# Patient Record
Sex: MALE | Race: WHITE | NOT HISPANIC OR LATINO | Employment: UNEMPLOYED | URBAN - METROPOLITAN AREA
[De-identification: names, ages, dates, MRNs, and addresses within clinical notes are randomized per-mention and may not be internally consistent; named-entity substitution may affect disease eponyms.]

---

## 2018-05-03 ENCOUNTER — HOSPITAL ENCOUNTER (EMERGENCY)
Facility: HOSPITAL | Age: 58
Discharge: HOME | End: 2018-05-03
Attending: FAMILY MEDICINE

## 2018-05-03 VITALS
DIASTOLIC BLOOD PRESSURE: 83 MMHG | BODY MASS INDEX: 43.95 KG/M2 | RESPIRATION RATE: 20 BRPM | SYSTOLIC BLOOD PRESSURE: 169 MMHG | HEIGHT: 67 IN | OXYGEN SATURATION: 98 % | WEIGHT: 280 LBS | TEMPERATURE: 98 F | HEART RATE: 82 BPM

## 2018-05-03 DIAGNOSIS — M79.673 FOOT PAIN: Primary | ICD-10-CM

## 2018-05-03 PROCEDURE — 25000003 PHARM REV CODE 250: Performed by: FAMILY MEDICINE

## 2018-05-03 PROCEDURE — 99283 EMERGENCY DEPT VISIT LOW MDM: CPT

## 2018-05-03 RX ORDER — LISINOPRIL 20 MG/1
20 TABLET ORAL 2 TIMES DAILY
COMMUNITY

## 2018-05-03 RX ORDER — DICLOFENAC SODIUM 50 MG/1
50 TABLET, DELAYED RELEASE ORAL 2 TIMES DAILY
Qty: 21 TABLET | Refills: 0 | Status: SHIPPED | OUTPATIENT
Start: 2018-05-03 | End: 2023-07-18 | Stop reason: CLARIF

## 2018-05-03 RX ORDER — IBUPROFEN 600 MG/1
600 TABLET ORAL
Status: COMPLETED | OUTPATIENT
Start: 2018-05-03 | End: 2018-05-03

## 2018-05-03 RX ADMIN — IBUPROFEN 600 MG: 600 TABLET, FILM COATED ORAL at 05:05

## 2018-05-03 NOTE — ED PROVIDER NOTES
Encounter Date: 5/3/2018       History     Chief Complaint   Patient presents with    Foot Pain     L foot pain worsening x 3 days; hx of chronic foot pain x 2 years s/p foot surgery; denies reinjury     57-year-old male patient with left sided foot pain patient has history of chronic foot pain with this patient has a walking Tamil boot on otherwise patient states it's more a matter of working with his Work[employer]'s [employer]. for surgery that he needs on his foot otherwise patient continues to have discomfort of the left foot he does have a history of surgery there before with screws in place.          Review of patient's allergies indicates:  No Known Allergies  Past Medical History:   Diagnosis Date    Hypertension      Past Surgical History:   Procedure Laterality Date    FOOT SURGERY Left      History reviewed. No pertinent family history.  Social History   Substance Use Topics    Smoking status: Current Every Day Smoker     Packs/day: 1.00     Types: Cigarettes    Smokeless tobacco: Never Used    Alcohol use Yes      Comment: twice a week     Review of Systems   Constitutional: Negative for fever.   HENT: Negative for sore throat.    Respiratory: Negative for shortness of breath.    Cardiovascular: Negative for chest pain.   Gastrointestinal: Negative for nausea.   Genitourinary: Negative for dysuria.   Musculoskeletal: Positive for arthralgias and myalgias. Negative for back pain.   Skin: Negative for rash.   Neurological: Negative for weakness.   Hematological: Does not bruise/bleed easily.   All other systems reviewed and are negative.      Physical Exam     Initial Vitals [05/03/18 0432]   BP Pulse Resp Temp SpO2   (!) 194/103 93 18 97.7 °F (36.5 °C) 100 %      MAP       133.33         Physical Exam    Nursing note and vitals reviewed.  Constitutional: He appears well-developed and well-nourished.   HENT:   Head: Normocephalic and atraumatic.   Eyes: Conjunctivae and EOM are normal. Pupils are equal, round,  and reactive to light.   Neck: Normal range of motion. Neck supple.   Cardiovascular: Normal rate, regular rhythm and normal heart sounds.   Pulmonary/Chest: Breath sounds normal.   Abdominal: Soft. Bowel sounds are normal.   Musculoskeletal: Normal range of motion. He exhibits tenderness.   Neurological: He is alert. He has normal reflexes.         ED Course   Procedures  Labs Reviewed - No data to display       X-Rays:   Independently Interpreted Readings:   Other Readings:  Please see radiology report    Medical Decision Making:   Initial Assessment:   Patient sitting in no distress and pleasant. Patient has no other complaints other than documented.     Differential Diagnosis:   Fracture  Edema  Sprain   strain                        Clinical Impression:   The encounter diagnosis was Foot pain.                           Avni Dela Cruz MD  05/03/18 8769

## 2022-05-13 ENCOUNTER — HOSPITAL ENCOUNTER (EMERGENCY)
Facility: HOSPITAL | Age: 62
Discharge: STILL ADMITTED OR ADMINISTRATIVE | End: 2022-05-13
Attending: EMERGENCY MEDICINE
Payer: MEDICAID

## 2022-05-13 ENCOUNTER — HOSPITAL ENCOUNTER (OUTPATIENT)
Facility: HOSPITAL | Age: 62
Discharge: HOME | End: 2022-05-14
Attending: HOSPITALIST | Admitting: HOSPITALIST
Payer: MEDICAID

## 2022-05-13 VITALS
TEMPERATURE: 99 F | RESPIRATION RATE: 20 BRPM | DIASTOLIC BLOOD PRESSURE: 85 MMHG | HEART RATE: 84 BPM | WEIGHT: 217 LBS | SYSTOLIC BLOOD PRESSURE: 141 MMHG | BODY MASS INDEX: 33.99 KG/M2 | OXYGEN SATURATION: 95 %

## 2022-05-13 DIAGNOSIS — I26.99 BILATERAL PULMONARY EMBOLISM: Primary | ICD-10-CM

## 2022-05-13 DIAGNOSIS — R07.9 CHEST PAIN: ICD-10-CM

## 2022-05-13 DIAGNOSIS — J15.9 BACTERIAL PNEUMONIA: ICD-10-CM

## 2022-05-13 DIAGNOSIS — I26.99 PULMONARY EMBOLISM: ICD-10-CM

## 2022-05-13 DIAGNOSIS — I26.99 OTHER PULMONARY EMBOLISM WITHOUT ACUTE COR PULMONALE, UNSPECIFIED CHRONICITY: Primary | ICD-10-CM

## 2022-05-13 PROBLEM — I10 ESSENTIAL HYPERTENSION: Status: ACTIVE | Noted: 2022-05-13

## 2022-05-13 PROBLEM — E66.811 CLASS 1 OBESITY IN ADULT: Status: ACTIVE | Noted: 2022-05-13

## 2022-05-13 PROBLEM — Z72.0 TOBACCO ABUSE: Status: ACTIVE | Noted: 2022-05-13

## 2022-05-13 PROBLEM — R14.0 ABDOMINAL BLOATING: Status: ACTIVE | Noted: 2022-05-13

## 2022-05-13 PROBLEM — E66.9 CLASS 1 OBESITY IN ADULT: Status: ACTIVE | Noted: 2022-05-13

## 2022-05-13 LAB
ALBUMIN SERPL BCP-MCNC: 3.8 G/DL (ref 3.5–5.2)
ALP SERPL-CCNC: 91 U/L (ref 38–126)
ALT SERPL W/O P-5'-P-CCNC: 14 U/L (ref 10–44)
AMPHET+METHAMPHET UR QL: NEGATIVE
ANION GAP SERPL CALC-SCNC: 8 MMOL/L (ref 8–16)
APTT BLDCRRT: 28.6 SEC (ref 21–32)
AST SERPL-CCNC: 20 U/L (ref 15–46)
AV INDEX (PROSTH): 0.44
AV MEAN GRADIENT: 20 MMHG
AV PEAK GRADIENT: 36 MMHG
AV VALVE AREA: 1.45 CM2
AV VELOCITY RATIO: 0.39
BARBITURATES UR QL SCN>200 NG/ML: NEGATIVE
BASOPHILS # BLD AUTO: 0.08 K/UL (ref 0–0.2)
BASOPHILS NFR BLD: 0.7 % (ref 0–1.9)
BENZODIAZ UR QL SCN>200 NG/ML: NEGATIVE
BILIRUB SERPL-MCNC: 1 MG/DL (ref 0.1–1)
BSA FOR ECHO PROCEDURE: 2.18 M2
BZE UR QL SCN: NEGATIVE
CALCIUM SERPL-MCNC: 8.8 MG/DL (ref 8.7–10.5)
CANNABINOIDS UR QL SCN: NEGATIVE
CHLORIDE SERPL-SCNC: 101 MMOL/L (ref 95–110)
CO2 SERPL-SCNC: 29 MMOL/L (ref 23–29)
CREAT SERPL-MCNC: 0.58 MG/DL (ref 0.5–1.4)
CREAT UR-MCNC: 74.3 MG/DL (ref 23–375)
CV ECHO LV RWT: 0.51 CM
D DIMER PPP IA.FEU-MCNC: 6.44 MG/L FEU
DIFFERENTIAL METHOD: ABNORMAL
DOP CALC AO PEAK VEL: 2.98 M/S
DOP CALC AO VTI: 56.41 CM
DOP CALC LVOT AREA: 3.3 CM2
DOP CALC LVOT DIAMETER: 2.04 CM
DOP CALC LVOT PEAK VEL: 1.15 M/S
DOP CALC LVOT STROKE VOLUME: 81.8 CM3
DOP CALC MV VTI: 38.15 CM
DOP CALCLVOT PEAK VEL VTI: 25.04 CM
E WAVE DECELERATION TIME: 153.92 MSEC
E/A RATIO: 0.94
E/E' RATIO: 21.29 M/S
ECHO LV POSTERIOR WALL: 0.97 CM (ref 0.6–1.1)
EJECTION FRACTION: 60 %
EOSINOPHIL # BLD AUTO: 0.2 K/UL (ref 0–0.5)
EOSINOPHIL NFR BLD: 1.4 % (ref 0–8)
ERYTHROCYTE [DISTWIDTH] IN BLOOD BY AUTOMATED COUNT: 13.4 % (ref 11.5–14.5)
EST. GFR  (AFRICAN AMERICAN): >60 ML/MIN/1.73 M^2
EST. GFR  (NON AFRICAN AMERICAN): >60 ML/MIN/1.73 M^2
FRACTIONAL SHORTENING: 29 % (ref 28–44)
GLUCOSE SERPL-MCNC: 102 MG/DL (ref 70–110)
HCT VFR BLD AUTO: 48.9 % (ref 40–54)
HGB BLD-MCNC: 17.3 G/DL (ref 14–18)
HIV1+2 IGG SERPL QL IA.RAPID: NORMAL
IMM GRANULOCYTES # BLD AUTO: 0.04 K/UL (ref 0–0.04)
IMM GRANULOCYTES NFR BLD AUTO: 0.4 % (ref 0–0.5)
INR PPP: 1 (ref 0.8–1.2)
INTERVENTRICULAR SEPTUM: 1.13 CM (ref 0.6–1.1)
LA MAJOR: 4.82 CM
LA MINOR: 5.74 CM
LA WIDTH: 4.15 CM
LEFT ATRIUM SIZE: 4.18 CM
LEFT ATRIUM VOLUME INDEX: 36.4 ML/M2
LEFT ATRIUM VOLUME: 77.26 CM3
LEFT INTERNAL DIMENSION IN SYSTOLE: 2.72 CM (ref 2.1–4)
LEFT VENTRICLE DIASTOLIC VOLUME INDEX: 29.64 ML/M2
LEFT VENTRICLE DIASTOLIC VOLUME: 62.84 ML
LEFT VENTRICLE MASS INDEX: 60 G/M2
LEFT VENTRICLE SYSTOLIC VOLUME INDEX: 13 ML/M2
LEFT VENTRICLE SYSTOLIC VOLUME: 27.47 ML
LEFT VENTRICULAR INTERNAL DIMENSION IN DIASTOLE: 3.82 CM (ref 3.5–6)
LEFT VENTRICULAR MASS: 126.84 G
LV LATERAL E/E' RATIO: 24.83 M/S
LV SEPTAL E/E' RATIO: 18.63 M/S
LYMPHOCYTES # BLD AUTO: 2.2 K/UL (ref 1–4.8)
LYMPHOCYTES NFR BLD: 19.4 % (ref 18–48)
MCH RBC QN AUTO: 37.1 PG (ref 27–31)
MCHC RBC AUTO-ENTMCNC: 35.4 G/DL (ref 32–36)
MCV RBC AUTO: 105 FL (ref 82–98)
METHADONE UR QL SCN>300 NG/ML: NEGATIVE
MONOCYTES # BLD AUTO: 1 K/UL (ref 0.3–1)
MONOCYTES NFR BLD: 8.8 % (ref 4–15)
MV MEAN GRADIENT: 1 MMHG
MV PEAK A VEL: 1.59 M/S
MV PEAK E VEL: 1.49 M/S
MV PEAK GRADIENT: 14 MMHG
MV STENOSIS PRESSURE HALF TIME: 44.64 MS
MV VALVE AREA BY CONTINUITY EQUATION: 2.14 CM2
MV VALVE AREA P 1/2 METHOD: 4.93 CM2
NEUTROPHILS # BLD AUTO: 7.7 K/UL (ref 1.8–7.7)
NEUTROPHILS NFR BLD: 69.3 % (ref 38–73)
NRBC BLD-RTO: 0 /100 WBC
OPIATES UR QL SCN: NEGATIVE
PCP UR QL SCN>25 NG/ML: NEGATIVE
PISA TR MAX VEL: 2.22 M/S
PLATELET # BLD AUTO: 177 K/UL (ref 150–450)
PMV BLD AUTO: 10.5 FL (ref 9.2–12.9)
POTASSIUM SERPL-SCNC: 4.2 MMOL/L (ref 3.5–5.1)
PROCALCITONIN SERPL IA-MCNC: 0.03 NG/ML
PROT SERPL-MCNC: 7.4 G/DL (ref 6–8.4)
PROTHROMBIN TIME: 10.9 SEC (ref 9–12.5)
RA PRESSURE: 3 MMHG
RBC # BLD AUTO: 4.66 M/UL (ref 4.6–6.2)
SARS-COV-2 RDRP RESP QL NAA+PROBE: NEGATIVE
SODIUM SERPL-SCNC: 138 MMOL/L (ref 136–145)
TDI LATERAL: 0.06 M/S
TDI SEPTAL: 0.08 M/S
TDI: 0.07 M/S
TOXICOLOGY INFORMATION: NORMAL
TR MAX PG: 20 MMHG
TROPONIN I SERPL-MCNC: <0.012 NG/ML (ref 0.01–0.03)
TSH SERPL DL<=0.005 MIU/L-ACNC: 2.71 UIU/ML (ref 0.4–4)
TV REST PULMONARY ARTERY PRESSURE: 23 MMHG
UUN UR-MCNC: 7 MG/DL (ref 2–20)
WBC # BLD AUTO: 11.09 K/UL (ref 3.9–12.7)

## 2022-05-13 PROCEDURE — 87040 BLOOD CULTURE FOR BACTERIA: CPT | Mod: 59,ER | Performed by: EMERGENCY MEDICINE

## 2022-05-13 PROCEDURE — A9698 NON-RAD CONTRAST MATERIALNOC: HCPCS | Performed by: HOSPITALIST

## 2022-05-13 PROCEDURE — 93010 ELECTROCARDIOGRAM REPORT: CPT | Mod: ,,, | Performed by: INTERNAL MEDICINE

## 2022-05-13 PROCEDURE — 84153 ASSAY OF PSA TOTAL: CPT | Performed by: HOSPITALIST

## 2022-05-13 PROCEDURE — 86703 HIV-1/HIV-2 1 RESULT ANTBDY: CPT | Performed by: HOSPITALIST

## 2022-05-13 PROCEDURE — 25500020 PHARM REV CODE 255: Performed by: HOSPITALIST

## 2022-05-13 PROCEDURE — G0378 HOSPITAL OBSERVATION PER HR: HCPCS

## 2022-05-13 PROCEDURE — 85379 FIBRIN DEGRADATION QUANT: CPT | Mod: ER | Performed by: EMERGENCY MEDICINE

## 2022-05-13 PROCEDURE — 85610 PROTHROMBIN TIME: CPT | Mod: ER | Performed by: EMERGENCY MEDICINE

## 2022-05-13 PROCEDURE — 96372 THER/PROPH/DIAG INJ SC/IM: CPT | Mod: 59 | Performed by: HOSPITALIST

## 2022-05-13 PROCEDURE — 63600175 PHARM REV CODE 636 W HCPCS: Performed by: HOSPITALIST

## 2022-05-13 PROCEDURE — 80307 DRUG TEST PRSMV CHEM ANLYZR: CPT | Performed by: HOSPITALIST

## 2022-05-13 PROCEDURE — 99285 EMERGENCY DEPT VISIT HI MDM: CPT | Mod: 25,ER

## 2022-05-13 PROCEDURE — 96365 THER/PROPH/DIAG IV INF INIT: CPT | Mod: ER,59

## 2022-05-13 PROCEDURE — 36415 COLL VENOUS BLD VENIPUNCTURE: CPT | Performed by: HOSPITALIST

## 2022-05-13 PROCEDURE — G0379 DIRECT REFER HOSPITAL OBSERV: HCPCS

## 2022-05-13 PROCEDURE — U0002 COVID-19 LAB TEST NON-CDC: HCPCS | Mod: ER | Performed by: EMERGENCY MEDICINE

## 2022-05-13 PROCEDURE — S4991 NICOTINE PATCH NONLEGEND: HCPCS | Performed by: HOSPITALIST

## 2022-05-13 PROCEDURE — 25000003 PHARM REV CODE 250: Mod: ER | Performed by: EMERGENCY MEDICINE

## 2022-05-13 PROCEDURE — 84145 PROCALCITONIN (PCT): CPT | Performed by: HOSPITALIST

## 2022-05-13 PROCEDURE — 85025 COMPLETE CBC W/AUTO DIFF WBC: CPT | Mod: ER | Performed by: EMERGENCY MEDICINE

## 2022-05-13 PROCEDURE — 96372 THER/PROPH/DIAG INJ SC/IM: CPT | Performed by: EMERGENCY MEDICINE

## 2022-05-13 PROCEDURE — 63600175 PHARM REV CODE 636 W HCPCS: Mod: ER | Performed by: EMERGENCY MEDICINE

## 2022-05-13 PROCEDURE — 25500020 PHARM REV CODE 255: Mod: ER | Performed by: EMERGENCY MEDICINE

## 2022-05-13 PROCEDURE — 93010 EKG 12-LEAD: ICD-10-PCS | Mod: ,,, | Performed by: INTERNAL MEDICINE

## 2022-05-13 PROCEDURE — 25000003 PHARM REV CODE 250: Performed by: HOSPITALIST

## 2022-05-13 PROCEDURE — 96375 TX/PRO/DX INJ NEW DRUG ADDON: CPT | Mod: ER

## 2022-05-13 PROCEDURE — 85730 THROMBOPLASTIN TIME PARTIAL: CPT | Mod: ER | Performed by: EMERGENCY MEDICINE

## 2022-05-13 PROCEDURE — 93005 ELECTROCARDIOGRAM TRACING: CPT | Mod: ER

## 2022-05-13 PROCEDURE — 84443 ASSAY THYROID STIM HORMONE: CPT | Performed by: HOSPITALIST

## 2022-05-13 PROCEDURE — 80053 COMPREHEN METABOLIC PANEL: CPT | Mod: ER | Performed by: EMERGENCY MEDICINE

## 2022-05-13 PROCEDURE — 84484 ASSAY OF TROPONIN QUANT: CPT | Mod: ER | Performed by: EMERGENCY MEDICINE

## 2022-05-13 RX ORDER — IBUPROFEN 200 MG
24 TABLET ORAL
Status: DISCONTINUED | OUTPATIENT
Start: 2022-05-13 | End: 2022-05-14 | Stop reason: HOSPADM

## 2022-05-13 RX ORDER — LISINOPRIL 20 MG/1
20 TABLET ORAL
Status: COMPLETED | OUTPATIENT
Start: 2022-05-13 | End: 2022-05-13

## 2022-05-13 RX ORDER — SODIUM CHLORIDE 0.9 % (FLUSH) 0.9 %
10 SYRINGE (ML) INJECTION EVERY 12 HOURS PRN
Status: DISCONTINUED | OUTPATIENT
Start: 2022-05-13 | End: 2022-05-14 | Stop reason: HOSPADM

## 2022-05-13 RX ORDER — ONDANSETRON 2 MG/ML
4 INJECTION INTRAMUSCULAR; INTRAVENOUS EVERY 4 HOURS PRN
Status: DISCONTINUED | OUTPATIENT
Start: 2022-05-13 | End: 2022-05-14 | Stop reason: HOSPADM

## 2022-05-13 RX ORDER — ENOXAPARIN SODIUM 100 MG/ML
1 INJECTION SUBCUTANEOUS EVERY 12 HOURS
Status: DISCONTINUED | OUTPATIENT
Start: 2022-05-13 | End: 2022-05-14 | Stop reason: HOSPADM

## 2022-05-13 RX ORDER — ACETAMINOPHEN 325 MG/1
650 TABLET ORAL EVERY 6 HOURS PRN
Status: DISCONTINUED | OUTPATIENT
Start: 2022-05-13 | End: 2022-05-14 | Stop reason: HOSPADM

## 2022-05-13 RX ORDER — KETOROLAC TROMETHAMINE 30 MG/ML
15 INJECTION, SOLUTION INTRAMUSCULAR; INTRAVENOUS
Status: COMPLETED | OUTPATIENT
Start: 2022-05-13 | End: 2022-05-13

## 2022-05-13 RX ORDER — TALC
6 POWDER (GRAM) TOPICAL NIGHTLY PRN
Status: DISCONTINUED | OUTPATIENT
Start: 2022-05-13 | End: 2022-05-14 | Stop reason: HOSPADM

## 2022-05-13 RX ORDER — GLUCAGON 1 MG
1 KIT INJECTION
Status: DISCONTINUED | OUTPATIENT
Start: 2022-05-13 | End: 2022-05-14 | Stop reason: HOSPADM

## 2022-05-13 RX ORDER — LIDOCAINE 50 MG/G
1 PATCH TOPICAL
Status: DISCONTINUED | OUTPATIENT
Start: 2022-05-13 | End: 2022-05-14 | Stop reason: HOSPADM

## 2022-05-13 RX ORDER — ENOXAPARIN SODIUM 100 MG/ML
100 INJECTION SUBCUTANEOUS
Status: COMPLETED | OUTPATIENT
Start: 2022-05-13 | End: 2022-05-13

## 2022-05-13 RX ORDER — IBUPROFEN 600 MG/1
600 TABLET ORAL EVERY 12 HOURS PRN
Refills: 0 | Status: DISCONTINUED | OUTPATIENT
Start: 2022-05-13 | End: 2022-05-14 | Stop reason: HOSPADM

## 2022-05-13 RX ORDER — HYDRALAZINE HYDROCHLORIDE 20 MG/ML
15 INJECTION INTRAMUSCULAR; INTRAVENOUS EVERY 4 HOURS PRN
Status: DISCONTINUED | OUTPATIENT
Start: 2022-05-13 | End: 2022-05-14 | Stop reason: HOSPADM

## 2022-05-13 RX ORDER — NALOXONE HCL 0.4 MG/ML
0.02 VIAL (ML) INJECTION
Status: DISCONTINUED | OUTPATIENT
Start: 2022-05-13 | End: 2022-05-14 | Stop reason: HOSPADM

## 2022-05-13 RX ORDER — IBUPROFEN 200 MG
16 TABLET ORAL
Status: DISCONTINUED | OUTPATIENT
Start: 2022-05-13 | End: 2022-05-14 | Stop reason: HOSPADM

## 2022-05-13 RX ORDER — ASPIRIN 325 MG
325 TABLET ORAL
Status: COMPLETED | OUTPATIENT
Start: 2022-05-13 | End: 2022-05-13

## 2022-05-13 RX ORDER — LISINOPRIL 20 MG/1
40 TABLET ORAL DAILY
Status: DISCONTINUED | OUTPATIENT
Start: 2022-05-13 | End: 2022-05-14 | Stop reason: HOSPADM

## 2022-05-13 RX ORDER — IBUPROFEN 200 MG
1 TABLET ORAL DAILY
Status: DISCONTINUED | OUTPATIENT
Start: 2022-05-13 | End: 2022-05-14 | Stop reason: HOSPADM

## 2022-05-13 RX ORDER — LOPERAMIDE HYDROCHLORIDE 2 MG/1
2 CAPSULE ORAL 4 TIMES DAILY PRN
Status: DISCONTINUED | OUTPATIENT
Start: 2022-05-13 | End: 2022-05-14 | Stop reason: HOSPADM

## 2022-05-13 RX ADMIN — BARIUM SULFATE 450 ML: 20 SUSPENSION ORAL at 02:05

## 2022-05-13 RX ADMIN — KETOROLAC TROMETHAMINE 15 MG: 30 INJECTION, SOLUTION INTRAMUSCULAR at 05:05

## 2022-05-13 RX ADMIN — LIDOCAINE 1 PATCH: 50 PATCH TOPICAL at 02:05

## 2022-05-13 RX ADMIN — NICOTINE 1 PATCH: 14 PATCH TRANSDERMAL at 02:05

## 2022-05-13 RX ADMIN — ENOXAPARIN SODIUM 100 MG: 100 INJECTION SUBCUTANEOUS at 08:05

## 2022-05-13 RX ADMIN — ASPIRIN 325 MG ORAL TABLET 325 MG: 325 PILL ORAL at 05:05

## 2022-05-13 RX ADMIN — IOHEXOL 80 ML: 350 INJECTION, SOLUTION INTRAVENOUS at 07:05

## 2022-05-13 RX ADMIN — LISINOPRIL 20 MG: 20 TABLET ORAL at 08:05

## 2022-05-13 RX ADMIN — CEFTRIAXONE 1 G: 1 INJECTION, SOLUTION INTRAVENOUS at 08:05

## 2022-05-13 NOTE — ED NOTES
ATTENDING ASSUMPTION OF CARE/UPDATE NOTE:  At time of sign-out, patient pending labs/workup and reassessment.  Patient resting in bed comfortably.  Resulted labs unremarkable.  D-dimer pending.  Chest x-ray final read pending.  Will reassess.  Disposition pending results.    6:28 AM 5/13/2022  Jovana Johansen MD    Update note:  Patient CTA shows multiple bilateral PEs with concern for pneumonia.  Patient denies any recent surgery, bleeding or blood thinner use. Will initiate anticoagulation.  Will consult for transfer for admission for bilateral PEs with pneumonia.  Awaiting accepting facility.    7:36 AM 5/13/2022  Jovana Johansen MD    Update note:  Spoke with transfer center, Dr. Solis, who states that the patient will be transferred [place] the [facility] [place] management and treatment.  Patient updated on plan of care.  Patient verbalized understanding agrees plan of care.  Awaiting EMS transport.    9:05 AM 5/13/2022  Jovana Johansen MD    Update note:  Patient in route with EMS to accepting facility.  Patient stable at time of departure from this ED.    11:28 AM 5/13/2022  Jovana Johansen MD              DISCLAIMER: This note was prepared with [employer] voice recognition transcription software. Garbled syntax, mangled pronouns, and other bizarre constructions may be attributed to that software system        Jovana Johansen MD  05/13/22 1120

## 2022-05-13 NOTE — ASSESSMENT & PLAN NOTE
-Noted 6-8 months of abdominal bloating without pain and patient suspects he has a cancer.  No B symptoms (night sweats or weight loss).  -Has not had routine cancer screenings.  -Needs outpatient colonoscopy.  -Noted small right kidney exophytic mass.  Will ask urology to review.    -Check CT A/P and PSA.  No IV contrast at this time due to severe national shortage.  -Needs to stop smoking.

## 2022-05-13 NOTE — ED PROVIDER NOTES
Chief Complaint: chest pain     History of Present Illness:    Trevor Rivera 61 y.o. with a  has a past medical history of Hypertension. who presents to the emergency department today with a complaint of chest pain.  Patient reports she is having left-sided chest pain that started 2 days ago.  It is located at the lateral aspect of the lower ribs.  He denies any injury or trauma.  It started intermittently and now has become constant.  It is worse with deep breathing.  It is not reproducible.  He has no cough, sputum production, hemoptysis, leg swelling, palpitations, shortness of breath.  Feels he can not take a deep breath secondary to the pain.  He has never had a history of blood clots.    ROS    Constitutional: No fever, no chills.  Eyes: No discharge. No pain.  HENT: No nasal drainage. No ear ache. No sore throat.  Cardiovascular:  See above  Respiratory:  See above  Gastrointestinal: No abdominal pain, no vomiting. No diarrhea.  Genitourinary: No hematuria, dysuria, urgency.  Musculoskeletal: No back pain.   Skin: No rashes, no lesions.  Neurological: No headache, no focal weakness.    Otherwise remaining ROS negative     The history is provided by the patient      ALLERGIES REVIEWED  MEDICATIONS REVIEWED  PMH/PSH/SOC/FH REVIEWED :  Trevor Rivera  has a past medical history of Hypertension. and   has a past surgical history that includes Foot surgery (Left). with  reports that he has been smoking cigarettes. He has been smoking about 1.00 pack per day. He has never used smokeless tobacco. He reports current alcohol use. He reports that he does not use drugs. and a family history is not on file.      Nursing/Ancillary staff note reviewed.  VS reviewed         Physical Exam     BP (!) 184/90 (BP Location: Left arm, Patient Position: Sitting)   Pulse 90   Temp 97.7 °F (36.5 °C) (Oral)   Resp 17   Wt 98.4 kg (217 lb)   SpO2 97%   BMI 33.99 kg/m²     Physical Exam    General Appearance:  The patient is alert, has no immediate need for airway protection and no signs of toxicity. No acute distress. Lying in bed but able to sit up without difficulty.   HEENT: Eyes: Pupils equal and round no pallor or injection. Extra ocular movements intact. No drainage.       Mouth: Mucous membranes are moist. Oropharynx clear.   Neck:Neck is supple non-tender. No lymphadenopathy. No stridor.   Respiratory: There are no retractions, lungs are clear to auscultation. No wheezing, no crackles. Chest wall nontender to palpation.   Cardiovascular: Regular rate and rhythm. No murmurs, rubs or gallops.  Gastrointestinal:  Abdomen is soft and non-tender, no masses, bowel sounds normal. No guarding, no rebound.  No pulsatile mass.   Neurological: Alert and oriented x 4. CN II-XII grossly intact. No focal weakness. Strength intact 5/5 bilaterally in upper and lower extremities.   Skin: Warm and dry, no rashes.  Musculoskeletal: Extremities are non-tender, non-swollen and have full range of motion. Back NTTP along the midline.     DIFFERENTIAL DIAGNOSIS: After history and physical exam a differential diagnosis was considered, but was not limited to, myocardial ischemia, pericarditis, pulmonary embolus, chest wall pain, pleural inflammation, pulmonary infectious causes, aortic dissection.           I independently ordered and interpreted the EKG and it showed: 83 bpm, normal axis, no STEMI, read by myself read by cardiology pending.     No results found for this or any previous visit.      ED Course     Medications   ketorolac injection 15 mg (15 mg Intravenous Given 5/13/22 2074)   aspirin tablet 325 mg (325 mg Oral Given 5/13/22 9135)                     Medical Decision Making:   History:   I obtained history from:  The patient  Old Medical Records: I decided to obtain old medical records.       Initial management:  Trevor Rivera 61 y.o. male who presents to the ED today for chest pain which is worse with deep  breathing.  Nonreproducible.  No injury or trauma.  Will obtain labs including D-dimer, chest x-ray, EKG.  The patient was seen and examined, see chart. The history and physical exam was obtained, see chart. The nursing notes and vital signs were reviewed, see chart.          ED Management:  0546 patient care will be turned over to the oncoming physician, Dr. Johansen awaiting labs, chest x-ray, reassessment.  Patient was in stable condition upon my leaving the emergency department.  Dr. Johansen will make final disposition.    Voice recognition software utilized in this note.              Impression      Diagnoses of Chest pain and Chest pain were pertinent to this visit.                   Norman Vazquez MD  05/13/22 1330

## 2022-05-13 NOTE — PROVIDER TRANSFER
Outside Transfer Acceptance Note / Regional Referral Center    Referring facility: [facility] TRANSFER   Referring provider: JANETT CHACON  [place] [facility] [place]  Accepting provider: LIZET BOURGEOIS  Admitting provider: KINZA DYSON  Reason for transfer:  Capacity (in a freestanding emergency department)  Transfer diagnosis:  Bilateral pulmonary emboli  Transfer specialty requested: Hospital Medicine  Transfer specialty notified: yes  Transfer level: NUMBER 1-5: 2  Bed type requested: med-tele   Isolation status: No active isolations   Admission class or status: OP- Observation      Narrative   61-year-old male with a history of hypertension and tobacco use presented to the [facility] Emergency Department ED on May 13 with chest pain.  He noted pain over the lateral aspect of his left lower ribcage that started approximately 2 days prior to presentation.  It was initially intermittent, but it has become more constant.  Pain is worse with deep inspiration.  No cough, hemoptysis, edema, palpitations, or dyspnea noted.  CTA of the chest showed multiple bilateral pulmonary emboli as well as findings concerning for pulmonary infarcts and possible left lower lobe pneumonia.  In the emergency department he received aspirin, Rocephin, weight-based Lovenox, Toradol, and lisinopril.  Blood pressure was elevated in the emergency department but has improved recently.  Requesting transfer to Hospital Medicine at [facility] for further treatment.    INR 1, D-dimer 6.44, troponin negative, sodium 138, potassium 4.2, chloride 101, CO2 29, BUN 7, creatinine 0.58, glucose 102, AST 20, ALT 14, white blood cells 11.09, hemoglobin 17.3, hematocrit 40.9, platelets 177, COVID negative    CTA of the chest showed multiple bilateral pulmonary emboli.  Several small peripherally located wedge-shaped areas of increased density in the lateral aspect of the right middle lobe characteristic of  pulmonary infarcts.  Moderate amount of alveolar consolidation in the left lower lobe characteristic of pneumonia.  Streaky opacities in the lingula and right lower lobe characteristic of subsegmental atelectasis and/or scarring.  Prominence of the body of the left adrenal gland and right adrenal gland characteristic of adrenal adenomas.  Slightly exophytic hypodense mass off the posterior lateral aspect of the midpole of the right kidney characteristic of a cyst.    EKG showed normal sinus rhythm and appeared to be a normal EKG.    Objective     Vitals: Temp: 97.7 °F (36.5 °C) (05/13/22 0516)  Pulse: 90 (05/13/22 0932)  Resp: (!) 31 (05/13/22 0932)  BP: (!) 145/83 (05/13/22 0932)  SpO2: 97 % (05/13/22 0932)  Recent Labs:   CBC:   Recent Labs   Lab 05/13/22 0547   WBC 11.09   HGB 17.3   HCT 48.9        CMP:   Recent Labs   Lab 05/13/22 0547      K 4.2      CO2 29      BUN 7   CREATININE 0.58   CALCIUM 8.8   PROT 7.4   ALBUMIN 3.8   BILITOT 1.0   ALKPHOS 91   AST 20   ALT 14   ANIONGAP 8   EGFRNONAA >60.0     Troponin:   Recent Labs   Lab 05/13/22 0547   TROPONINI <0.012     Recent imaging: see above   IV access:        Peripheral IV - Single Lumen 05/13/22 0545 20 G Right Forearm (Active)   Site Assessment Clean;Dry;Intact;No redness;No swelling 05/13/22 0545   Extremity Assessment Distal to IV No abnormal discoloration 05/13/22 0545   Line Status Blood return noted;Flushed;Saline locked 05/13/22 0545   Dressing Status Clean;Dry;Intact 05/13/22 0545   Dressing Intervention First dressing 05/13/22 0545       Allergies: Review of patient's allergies indicates:  No Known Allergies   NPO: No      Anticoagulation:   Received weight based lovenox    Instructions    1. Admit to Hospital Medicine    Upon patient arrival to floor, please contact Hospital Medicine on call.     ANTHONY Solis MD  Hospital Medicine Staff  Cell: [phone]

## 2022-05-13 NOTE — PLAN OF CARE
AAOx4, medication administered per MD orders, telemetry box 5254 in place and audible and visible at nurses station, patient ambulates in room independently, patient educated on use of incentive spirometer, patient demonstrated good use with no adverse effects, patient is able to make needs known through out shift, patient safety maintained, bed in low locked position with call bell in reach.

## 2022-05-13 NOTE — ASSESSMENT & PLAN NOTE
-Admitted to observation status  -Noted left lateral chest wall pain x 2 days prompting evaluation  -CTA chest showed multiple pulmonary emboli bilaterally with several small peripherally located wedge-shaped areas of increased density in the lateral aspect of the right middle lobe - likely pulmonary infarcts.  Moderate LLL consolidation.  Prominence of the body of the bilateral adrenals - characteristic of adrenal adenomas.  A 20 mm slightly exophytic hypodense mass off of the posterolateral aspect of the midpole of the right kidney  -No clear etiology of PE.  No prolonged immobility or recent trauma.  Patient suspects he has a cancer based on 6-8 months of abdominal bloating, but has no night sweats or unexplained weight loss.  -Check procalcitonin, rapid HIV, PSA, doppler us of legs and CT A/P.  -Continue treatment with lovenox for now.  Anticipate transition to eliquis at discharge.

## 2022-05-13 NOTE — HPI
Mr. Rivera is a 61 year old man with hypertension and tobacco abuse who presented to [facility] ER today for evaluation of left lateral chest pain.  He states this started abruptly 2 days ago while sitting in his recliner at home.  At that time the pain was sharp, non-radiating and 8/10 in intensity.  It was worth with deep inspiration and was not relieved with ibuprofen.  It lasted for about 3 hours then resolve don its own.  The pain recurred yesterday afternoon, but last night around 9PM it dramatically worsened and was 10/10 in intensity.  He has had some very milod associated shortness of breath as well as diminished oral intake over the last 3 hours.  He denies fevers, chills, nausea, vomiting, diarrhea, leg swelling, prolonged immobility, falls, trauma or unexplained weight loss.  He does smoke about a pack of cigarettes a day and strongly suspects he has a cancer because of early satiety and abdominal bloating which have been occurring for 6-8 months.  In the [facility] ER he was diagnosed with bilateral pulmonary embolism and was transferred here for admission and further workup.

## 2022-05-13 NOTE — ASSESSMENT & PLAN NOTE
Body mass index is 34.84 kg/m². Morbid obesity complicates all aspects of disease management from diagnostic modalities to treatment. Weight loss encouraged and health benefits explained to patient.

## 2022-05-13 NOTE — ED NOTES
"Pt presents to ED with c/o non traumatic left lower rib pain x2 days. Pt states "it didn't become consistent until last night". Per pt pain began while at rest. Pt denies Sob but states pain is worse with inspiration.   "

## 2022-05-13 NOTE — NURSING
[facility] [place]  Nurses Note -- 4 Eyes      5/13/2022       Skin assessed on: 5/13/2022      [x] No Pressure Injuries Present    []Prevention Measures Documented    [] Yes LDA Previously documented     [] Yes New Pressure Injury Discovered   [] LDA Added      Attending RN:  Padmini Dela Cruz RN     Second RN:  Lisa Arredondo RN

## 2022-05-13 NOTE — H&P
[facility] Medicine  History & Physical    Patient Name: Trevor Rivera  MRN: [id number]  Patient Class: OP- Observation  Admission Date: 5/13/2022  Attending Physician: Tyree Moya MD  Primary Care Provider: Primary Doctor No         Patient information was obtained from patient and ER records.     Subjective:     Principal Problem:Bilateral pulmonary embolism    Chief Complaint: No chief complaint on file.       HPI: Mr. Rivera is a 61 year old man with hypertension and tobacco abuse who presented to [facility] ER today for evaluation of left lateral chest pain.  He states this started abruptly 2 days ago while sitting in his recliner at home.  At that time the pain was sharp, non-radiating and 8/10 in intensity.  It was worth with deep inspiration and was not relieved with ibuprofen.  It lasted for about 3 hours then resolve don its own.  The pain recurred yesterday afternoon, but last night around 9PM it dramatically worsened and was 10/10 in intensity.  He has had some very milod associated shortness of breath as well as diminished oral intake over the last 3 hours.  He denies fevers, chills, nausea, vomiting, diarrhea, leg swelling, prolonged immobility, falls, trauma or unexplained weight loss.  He does smoke about a pack of cigarettes a day and strongly suspects he has a cancer because of early satiety and abdominal bloating which have been occurring for 6-8 months.  In the [facility] ER he was diagnosed with bilateral pulmonary embolism and was transferred here for admission and further workup.      Past Medical History:   Diagnosis Date    Chronic foot pain     Hypertension     Tobacco abuse        Past Surgical History:   Procedure Laterality Date    FOOT SURGERY Left     SPINAL FUSION         Review of patient's allergies indicates:  No Known Allergies    Current Facility-Administered Medications on File Prior to Encounter   Medication    [COMPLETED] aspirin tablet  325 mg    [COMPLETED] cefTRIAXone (ROCEPHIN) 1 g/50 mL D5W IVPB    [COMPLETED] enoxaparin injection 100 mg    [COMPLETED] iohexoL (OMNIPAQUE 350) injection 80 mL    [COMPLETED] ketorolac injection 15 mg    [COMPLETED] lisinopriL tablet 20 mg     Current Outpatient Medications on File Prior to Encounter   Medication Sig    lisinopril (PRINIVIL,ZESTRIL) 20 MG tablet Take 20 mg by mouth 2 (two) times daily.    diclofenac (VOLTAREN) 50 MG EC tablet Take 1 tablet (50 mg total) by mouth 2 (two) times daily.     Family History       Problem Relation (Age of Onset)    No Known Problems Mother, Father          Tobacco Use    Smoking status: Current Every Day Smoker     Packs/day: 1.00     Types: Cigarettes    Smokeless tobacco: Never Used   Substance and Sexual Activity    Alcohol use: Yes     Comment: twice a week    Drug use: No    Sexual activity: Not on file     Review of Systems   Constitutional:  Positive for appetite change. Negative for activity change, chills and fever.   HENT:  Negative for congestion and dental problem.    Eyes:  Negative for discharge and itching.   Respiratory:  Positive for cough and shortness of breath. Negative for apnea and chest tightness.    Cardiovascular:  Positive for chest pain. Negative for palpitations and leg swelling.   Gastrointestinal:  Negative for abdominal distention, abdominal pain, diarrhea, nausea and vomiting.   Endocrine: Negative for cold intolerance and heat intolerance.   Genitourinary:  Negative for difficulty urinating and dysuria.   Musculoskeletal:  Negative for arthralgias and back pain.   Allergic/Immunologic: Negative for environmental allergies and food allergies.   Neurological:  Negative for dizziness, facial asymmetry and light-headedness.   Hematological:  Negative for adenopathy. Does not bruise/bleed easily.   Psychiatric/Behavioral:  Negative for agitation and behavioral problems.    Objective:     Vital Signs (Most Recent):  Temp: 97.6 °F  (36.4 °C) (05/13/22 1255)  Pulse: 83 (05/13/22 1255)  Resp: 17 (05/13/22 1255)  BP: (!) 159/81 (05/13/22 1255)  SpO2: 98 % (05/13/22 1255)   Vital Signs (24h Range):  Temp:  [97.6 °F (36.4 °C)-98.6 °F (37 °C)] 97.6 °F (36.4 °C)  Pulse:  [77-94] 83  Resp:  [17-31] 17  SpO2:  [89 %-98 %] 98 %  BP: (140-194)/() 159/81     Weight: 100.9 kg (222 lb 7.1 oz)  Body mass index is 34.84 kg/m².    Physical Exam  Vitals and nursing note reviewed.   Constitutional:       General: He is not in acute distress.     Appearance: He is well-developed. He is obese. He is not ill-appearing, toxic-appearing or diaphoretic.   HENT:      Head: Normocephalic and atraumatic.      Right Ear: External ear normal.      Left Ear: External ear normal.      Nose: Nose normal.      Mouth/Throat:      Mouth: Mucous membranes are moist.   Eyes:      Extraocular Movements: Extraocular movements intact.      Conjunctiva/sclera: Conjunctivae normal.   Cardiovascular:      Rate and Rhythm: Normal rate and regular rhythm.      Heart sounds: Normal heart sounds.   Pulmonary:      Effort: Pulmonary effort is normal. No respiratory distress.      Breath sounds: Normal breath sounds.   Abdominal:      General: Bowel sounds are normal. There is no distension.      Palpations: Abdomen is soft.      Tenderness: There is no abdominal tenderness.   Musculoskeletal:         General: Normal range of motion.      Cervical back: Normal range of motion. No rigidity.      Right lower leg: No edema.      Left lower leg: No edema.   Skin:     General: Skin is warm and dry.   Neurological:      Mental Status: He is alert and oriented to person, place, and time.      Cranial Nerves: No cranial nerve deficit.      Coordination: Coordination normal.   Psychiatric:         Behavior: Behavior normal.           Significant Labs: All pertinent labs within the past 24 hours have been reviewed.    Significant Imaging: I have reviewed all pertinent imaging results/findings  within the past 24 hours.    Assessment/Plan:     * Bilateral pulmonary embolism  -Admitted to observation status  -Noted left lateral chest wall pain x 2 days prompting evaluation  -CTA chest showed multiple pulmonary emboli bilaterally with several small peripherally located wedge-shaped areas of increased density in the lateral aspect of the right middle lobe - likely pulmonary infarcts.  Moderate LLL consolidation.  Prominence of the body of the bilateral adrenals - characteristic of adrenal adenomas.  A 20 mm slightly exophytic hypodense mass off of the posterolateral aspect of the midpole of the right kidney  -No clear etiology of PE.  No prolonged immobility or recent trauma.  Patient suspects he has a cancer based on 6-8 months of abdominal bloating, but has no night sweats or unexplained weight loss.  -Check procalcitonin, rapid HIV, PSA, doppler us of legs and CT A/P.  -Continue treatment with lovenox for now.  Anticipate transition to eliquis at discharge.    Abdominal bloating  -Noted 6-8 months of abdominal bloating without pain and patient suspects he has a cancer.  No B symptoms (night sweats or weight loss).  -Has not had routine cancer screenings.  -Needs outpatient colonoscopy.  -Noted small right kidney exophytic mass.  Will ask urology to review.    -Check CT A/P and PSA.  No IV contrast at this time due to severe national shortage.  -Needs to stop smoking.    Tobacco abuse  -Counselled on cessation 7 minutes.  -NRT ordered.    Essential hypertension  -Bp moderately elevated  -Continue home dose lisinopril  -Add PRN hydralazine.    Class 1 obesity in adult  Body mass index is 34.84 kg/m². Morbid obesity complicates all aspects of disease management from diagnostic modalities to treatment. Weight loss encouraged and health benefits explained to patient.       VTE Risk Mitigation (From admission, onward)         Ordered     enoxaparin injection 100 mg  Every 12 hours         05/13/22 1403     IP  VTE HIGH RISK PATIENT  Once         05/13/22 1403     Place sequential compression device  Until discontinued         05/13/22 1403                   Tyree Moya MD  Department of Hospital Medicine   [facility]

## 2022-05-13 NOTE — SUBJECTIVE & OBJECTIVE
Past Medical History:   Diagnosis Date    Chronic foot pain     Hypertension     Tobacco abuse        Past Surgical History:   Procedure Laterality Date    FOOT SURGERY Left     SPINAL FUSION         Review of patient's allergies indicates:  No Known Allergies    Current Facility-Administered Medications on File Prior to Encounter   Medication    [COMPLETED] aspirin tablet 325 mg    [COMPLETED] cefTRIAXone (ROCEPHIN) 1 g/50 mL D5W IVPB    [COMPLETED] enoxaparin injection 100 mg    [COMPLETED] iohexoL (OMNIPAQUE 350) injection 80 mL    [COMPLETED] ketorolac injection 15 mg    [COMPLETED] lisinopriL tablet 20 mg     Current Outpatient Medications on File Prior to Encounter   Medication Sig    lisinopril (PRINIVIL,ZESTRIL) 20 MG tablet Take 20 mg by mouth 2 (two) times daily.    diclofenac (VOLTAREN) 50 MG EC tablet Take 1 tablet (50 mg total) by mouth 2 (two) times daily.     Family History       Problem Relation (Age of Onset)    No Known Problems Mother, Father          Tobacco Use    Smoking status: Current Every Day Smoker     Packs/day: 1.00     Types: Cigarettes    Smokeless tobacco: Never Used   Substance and Sexual Activity    Alcohol use: Yes     Comment: twice a week    Drug use: No    Sexual activity: Not on file     Review of Systems   Constitutional:  Positive for appetite change. Negative for activity change, chills and fever.   HENT:  Negative for congestion and dental problem.    Eyes:  Negative for discharge and itching.   Respiratory:  Positive for cough and shortness of breath. Negative for apnea and chest tightness.    Cardiovascular:  Positive for chest pain. Negative for palpitations and leg swelling.   Gastrointestinal:  Negative for abdominal distention, abdominal pain, diarrhea, nausea and vomiting.   Endocrine: Negative for cold intolerance and heat intolerance.   Genitourinary:  Negative for difficulty urinating and dysuria.   Musculoskeletal:  Negative for arthralgias and back pain.    Allergic/Immunologic: Negative for environmental allergies and food allergies.   Neurological:  Negative for dizziness, facial asymmetry and light-headedness.   Hematological:  Negative for adenopathy. Does not bruise/bleed easily.   Psychiatric/Behavioral:  Negative for agitation and behavioral problems.    Objective:     Vital Signs (Most Recent):  Temp: 97.6 °F (36.4 °C) (05/13/22 1255)  Pulse: 83 (05/13/22 1255)  Resp: 17 (05/13/22 1255)  BP: (!) 159/81 (05/13/22 1255)  SpO2: 98 % (05/13/22 1255)   Vital Signs (24h Range):  Temp:  [97.6 °F (36.4 °C)-98.6 °F (37 °C)] 97.6 °F (36.4 °C)  Pulse:  [77-94] 83  Resp:  [17-31] 17  SpO2:  [89 %-98 %] 98 %  BP: (140-194)/() 159/81     Weight: 100.9 kg (222 lb 7.1 oz)  Body mass index is 34.84 kg/m².    Physical Exam  Vitals and nursing note reviewed.   Constitutional:       General: He is not in acute distress.     Appearance: He is well-developed. He is obese. He is not ill-appearing, toxic-appearing or diaphoretic.   HENT:      Head: Normocephalic and atraumatic.      Right Ear: External ear normal.      Left Ear: External ear normal.      Nose: Nose normal.      Mouth/Throat:      Mouth: Mucous membranes are moist.   Eyes:      Extraocular Movements: Extraocular movements intact.      Conjunctiva/sclera: Conjunctivae normal.   Cardiovascular:      Rate and Rhythm: Normal rate and regular rhythm.      Heart sounds: Normal heart sounds.   Pulmonary:      Effort: Pulmonary effort is normal. No respiratory distress.      Breath sounds: Normal breath sounds.   Abdominal:      General: Bowel sounds are normal. There is no distension.      Palpations: Abdomen is soft.      Tenderness: There is no abdominal tenderness.   Musculoskeletal:         General: Normal range of motion.      Cervical back: Normal range of motion. No rigidity.      Right lower leg: No edema.      Left lower leg: No edema.   Skin:     General: Skin is warm and dry.   Neurological:      Mental  Status: He is alert and oriented to person, place, and time.      Cranial Nerves: No cranial nerve deficit.      Coordination: Coordination normal.   Psychiatric:         Behavior: Behavior normal.           Significant Labs: All pertinent labs within the past 24 hours have been reviewed.    Significant Imaging: I have reviewed all pertinent imaging results/findings within the past 24 hours.

## 2022-05-13 NOTE — NURSING
Salvatore from Ct brought patient oral contrast to bedside, patient drank all of it, patient tolerated well.

## 2022-05-13 NOTE — NURSING
Patient arrived to floor via stretcher via transporter from [facility] ED. Patient transferred to bed via independently. AAOx4. Patient was oriented to room, information on whiteboard, and medication regimen. Bed low, adequate lighting provided, side rails x2 up, call bell within reach. Admission assessment completed. VSS. Patient denied having any acute distress at this time. None observed. Will continue to monitor and follow treatment plan. Patient placed on Telemetry box 1331, visible and audible at nurses station.

## 2022-05-14 VITALS
DIASTOLIC BLOOD PRESSURE: 77 MMHG | WEIGHT: 222.44 LBS | OXYGEN SATURATION: 98 % | TEMPERATURE: 99 F | HEART RATE: 81 BPM | BODY MASS INDEX: 34.91 KG/M2 | SYSTOLIC BLOOD PRESSURE: 147 MMHG | HEIGHT: 67 IN | RESPIRATION RATE: 18 BRPM

## 2022-05-14 LAB
ALBUMIN SERPL BCP-MCNC: 2.7 G/DL (ref 3.5–5.2)
ALP SERPL-CCNC: 79 U/L (ref 55–135)
ALT SERPL W/O P-5'-P-CCNC: 13 U/L (ref 10–44)
ANION GAP SERPL CALC-SCNC: 12 MMOL/L (ref 8–16)
AST SERPL-CCNC: 18 U/L (ref 10–40)
BILIRUB SERPL-MCNC: 0.8 MG/DL (ref 0.1–1)
BUN SERPL-MCNC: 9 MG/DL (ref 6–20)
CALCIUM SERPL-MCNC: 9.1 MG/DL (ref 8.7–10.5)
CHLORIDE SERPL-SCNC: 101 MMOL/L (ref 95–110)
CHOLEST SERPL-MCNC: 128 MG/DL (ref 120–199)
CHOLEST/HDLC SERPL: 2.4 {RATIO} (ref 2–5)
CO2 SERPL-SCNC: 24 MMOL/L (ref 23–29)
COMPLEXED PSA SERPL-MCNC: 0.8 NG/ML (ref 0–4)
CREAT SERPL-MCNC: 0.6 MG/DL (ref 0.5–1.4)
ERYTHROCYTE [DISTWIDTH] IN BLOOD BY AUTOMATED COUNT: 13.5 % (ref 11.5–14.5)
EST. GFR  (AFRICAN AMERICAN): >60 ML/MIN/1.73 M^2
EST. GFR  (NON AFRICAN AMERICAN): >60 ML/MIN/1.73 M^2
GLUCOSE SERPL-MCNC: 80 MG/DL (ref 70–110)
HCT VFR BLD AUTO: 48.1 % (ref 40–54)
HDLC SERPL-MCNC: 53 MG/DL (ref 40–75)
HDLC SERPL: 41.4 % (ref 20–50)
HGB BLD-MCNC: 16.5 G/DL (ref 14–18)
LDLC SERPL CALC-MCNC: 60.6 MG/DL (ref 63–159)
MCH RBC QN AUTO: 37 PG (ref 27–31)
MCHC RBC AUTO-ENTMCNC: 34.3 G/DL (ref 32–36)
MCV RBC AUTO: 108 FL (ref 82–98)
NONHDLC SERPL-MCNC: 75 MG/DL
PLATELET # BLD AUTO: 205 K/UL (ref 150–450)
PMV BLD AUTO: 11.4 FL (ref 9.2–12.9)
POTASSIUM SERPL-SCNC: 3.7 MMOL/L (ref 3.5–5.1)
PROT SERPL-MCNC: 6.7 G/DL (ref 6–8.4)
RBC # BLD AUTO: 4.46 M/UL (ref 4.6–6.2)
SODIUM SERPL-SCNC: 137 MMOL/L (ref 136–145)
TRIGL SERPL-MCNC: 72 MG/DL (ref 30–150)
WBC # BLD AUTO: 9.88 K/UL (ref 3.9–12.7)

## 2022-05-14 PROCEDURE — 36415 COLL VENOUS BLD VENIPUNCTURE: CPT | Performed by: HOSPITALIST

## 2022-05-14 PROCEDURE — 85027 COMPLETE CBC AUTOMATED: CPT | Performed by: HOSPITALIST

## 2022-05-14 PROCEDURE — 80053 COMPREHEN METABOLIC PANEL: CPT | Performed by: HOSPITALIST

## 2022-05-14 PROCEDURE — 80061 LIPID PANEL: CPT | Performed by: HOSPITALIST

## 2022-05-14 PROCEDURE — S4991 NICOTINE PATCH NONLEGEND: HCPCS | Performed by: HOSPITALIST

## 2022-05-14 PROCEDURE — G0378 HOSPITAL OBSERVATION PER HR: HCPCS

## 2022-05-14 PROCEDURE — 96372 THER/PROPH/DIAG INJ SC/IM: CPT | Performed by: HOSPITALIST

## 2022-05-14 PROCEDURE — 25000003 PHARM REV CODE 250: Performed by: HOSPITALIST

## 2022-05-14 PROCEDURE — 63600175 PHARM REV CODE 636 W HCPCS: Performed by: HOSPITALIST

## 2022-05-14 RX ORDER — LIDOCAINE 50 MG/G
1 PATCH TOPICAL DAILY
Qty: 30 PATCH | Refills: 1 | Status: SHIPPED | OUTPATIENT
Start: 2022-05-14 | End: 2023-07-18 | Stop reason: CLARIF

## 2022-05-14 RX ORDER — IBUPROFEN 200 MG
1 TABLET ORAL DAILY
Qty: 30 PATCH | Refills: 1 | Status: SHIPPED | OUTPATIENT
Start: 2022-05-15 | End: 2023-07-18 | Stop reason: CLARIF

## 2022-05-14 RX ADMIN — ENOXAPARIN SODIUM 100 MG: 100 INJECTION SUBCUTANEOUS at 08:05

## 2022-05-14 RX ADMIN — IBUPROFEN 600 MG: 600 TABLET, FILM COATED ORAL at 03:05

## 2022-05-14 RX ADMIN — LISINOPRIL 40 MG: 20 TABLET ORAL at 08:05

## 2022-05-14 RX ADMIN — NICOTINE 1 PATCH: 14 PATCH TRANSDERMAL at 08:05

## 2022-05-14 NOTE — NURSING
Pt received discharge instructions, pt verbalized understanding of discharge instructions, PT AAOx4, respirations even and unlabored, no acute distress, no complaints of pain, safety precautions in place,Case management has cleared pt for discharge. Dr. Moya came up to see pt

## 2022-05-14 NOTE — PLAN OF CARE
Problem: Adult Inpatient Plan of Care  Goal: Plan of Care Review  Outcome: Ongoing, Progressing  Goal: Patient-Specific Goal (Individualized)  Outcome: Ongoing, Progressing  Goal: Absence of Hospital-Acquired Illness or Injury  Outcome: Ongoing, Progressing  Intervention: Identify and Manage Fall Risk  Flowsheets (Taken 5/14/2022 0231)  Safety Promotion/Fall Prevention:   assistive device/personal item within reach   bed alarm refused  Intervention: Prevent Skin Injury  Flowsheets (Taken 5/14/2022 0231)  Skin Protection: adhesive use limited  Intervention: Prevent and Manage VTE (Venous Thromboembolism) Risk  Flowsheets (Taken 5/14/2022 0231)  Activity Management: Ambulated -L4  VTE Prevention/Management:   ambulation promoted   fluids promoted

## 2022-05-14 NOTE — NURSING
Pt Oriented x4 Cardiac monitoring (telebox #[id number] ), no shortness of breath or chest pain. Pt did complain of headache, ibuprofen given. will continue to monitor. Patient remains free from falls and hospital acquired injury/ illness. NAD. VSS. Medications given this shift per MD order. Care plan explained throughout shift and reinforced. Bed is locked and in low position. Call light within reach.

## 2022-05-14 NOTE — PLAN OF CARE
[facility] - Med Surg  Initial Discharge Assessment       Primary Care Provider: Primary Doctor No    Admission Diagnosis: Bilateral pulmonary embolism [I26.99]    Admission Date: 5/13/2022  Expected Discharge Date: 5/14/2022    Discharge Barriers Identified: None    Payor: MEDICAID / Plan: [facility] COMMUNITY PLAN [place] ([place] MEDICAID) / Product Type: Managed Medicaid /     Extended Emergency Contact Information  Primary Emergency Contact: Adeel Foster  Address: [address] [place] [address] [facility]  Home Phone: [phone]  Relation: Sister    Discharge Plan A: Home  Discharge Plan B: Home with family      [facility] [place] - [address]  [address] [facility] [place] [address]  Phone: [phone] Fax: [phone]      Initial Assessment (most recent)     Adult Discharge Assessment - 05/14/22 1059        Discharge Assessment    Assessment Type Discharge Planning Assessment     Confirmed/corrected address, phone number and insurance Yes     Confirmed Demographics Correct on Facesheet     Source of Information patient     When was your last doctors appointment? --   No PCP    Communicated JULISA with patient/caregiver Yes     Reason For Admission Bilateral pulmonary embolism     Lives With alone     Facility Arrived From: Home     Do you expect to return to your current living situation? Yes     Do you have help at home or someone to help you manage your care at home? Yes     Who are your caregiver(s) and their phone number(s)? Sister Adeel Foster [phone]     Prior to hospitilization cognitive status: Alert/Oriented     Current cognitive status: Alert/Oriented     Walking or Climbing Stairs Difficulty none     Dressing/Bathing Difficulty none     Home Layout Able to live on 1st floor     Equipment Currently Used at Home none     Patient currently being followed by outpatient case management? No     Do you currently have service(s) that help you manage your  care at home? No     Do you have prescription coverage? Yes     Coverage Medicaid [facility]     Do you have any problems affording any of your prescribed medications? TBD     Is the patient taking medications as prescribed? yes     Who is going to help you get home at discharge? Sister Adeel Foster [phone]     How do you get to doctors appointments? family or friend will provide;car, drives self     Are you on dialysis? No     Do you take coumadin? No     Discharge Plan A Home     Discharge Plan B Home with family     DME Needed Upon Discharge  none     Discharge Plan discussed with: Patient     Discharge Barriers Identified None               Pt was independent prior to his hospitalization. Pt needs a new PCP in his area of [place], [place]. Pt does not use DME, is not on dialysis or Coumadin.     SW will follow for discharge planning needs.     JONA Luu LMSW  [facility]  [id number]

## 2022-05-14 NOTE — ASSESSMENT & PLAN NOTE
-Admitted to observation status  -Noted left lateral chest wall pain x 2 days prompting evaluation  -CTA chest showed multiple pulmonary emboli bilaterally with several small peripherally located wedge-shaped areas of increased density in the lateral aspect of the right middle lobe - likely pulmonary infarcts.  Moderate LLL consolidation.  Prominence of the body of the bilateral adrenals - characteristic of adrenal adenomas.  A 20 mm slightly exophytic hypodense mass off of the posterolateral aspect of the midpole of the right kidney  -No clear etiology of PE.  No prolonged immobility or recent trauma.  Patient suspects he has a cancer based on 6-8 months of abdominal bloating, but has no night sweats or unexplained weight loss.  -Echo showed   · The left ventricle is normal in size with concentric hypertrophy and normal systolic function.  · The estimated ejection fraction is 60%.  · Grade II left ventricular diastolic dysfunction.  · Normal right ventricular size with normal right ventricular systolic function.  · Mild left atrial enlargement.  · Normal central venous pressure (3 mmHg).  · The estimated PA systolic pressure is 23 mmHg.      -Procalcitonin, rapid HIV, UDS all negative  -No obvious masses on ct abdomen and pelvis.  -Continue treatment with lovenox for now.  Anticipate transition to eliquis at discharge.

## 2022-05-14 NOTE — PLAN OF CARE
[facility] - Med Surg  Discharge Final Note    Primary Care Provider: Primary Doctor No    Expected Discharge Date: 5/14/2022    Final Discharge Note (most recent)     Final Note - 05/14/22 1103        Final Note    Assessment Type Final Discharge Note     Anticipated Discharge Disposition Home or Self Care     What phone number can be called within the next 1-3 days to see how you are doing after discharge? [phone]     Hospital Resources/Appts/Education Provided Provided patient/caregiver with written discharge plan information;Community resources provided        Post-Acute Status    Coverage Medicaid [facility]     Discharge Delays None known at this time               Contact Info     [facility]    [address] [place] [address] Ave,   [place] [address]   [phone]       Next Steps: Schedule an appointment as soon as possible for a visit in 3 day(s)    Instructions: Please call [facility] by Monday 5/16/2022 to schedule a hospital follow up. Please bring all hospital discharge paperwork, your ID and insurance card.        Pt was given community resources for a PCP in his area, this was explained to him that he must contact his [facility] and make an appointment Mon 5/16/2022.    Pt's pharmacy called with Breanna martinez for 30 day free trial.     Pt has no other post acute needs.     Pt is cleared for discharge from a case management standpoint.     JONA Luu, LMSW [facility]  [id number]

## 2022-05-14 NOTE — DISCHARGE INSTRUCTIONS
Take all medications as prescribed.  Eat a strict low salt cardiac diet.  Follow up with your physicians as scheduled - pcp within 1 week and hematology/oncology within 2 weeks.  Your right kidney had a small irregularly shaped spot which may be a cyst.  Please follow up with your primary care provider to discuss referral to urology for further evaluation.  Thank you for trusting [facility] and Dr. Moya with your care.  We are honored that you entrusted us with your healthcare needs. Your satisfaction is very important to us and we hope you have been very pleased with your experience at [facility]. After your discharge you may receive a survey asking you to rate your hospital experience. We encourage you to take the time to complete the survey as your feedback allows us to identify areas for improvement as well as recognize our staff.   We hope that you have received the very best care possible during your hospitalization at [facility], as your satisfaction is our top priority.

## 2022-05-14 NOTE — DISCHARGE SUMMARY
[facility] Medicine  Discharge Summary      Patient Name: Trevor Rivera  MRN: [id number]  Patient Class: OP- Observation  Admission Date: 5/13/2022  Hospital Length of Stay: 0 days  Discharge Date and Time: 5/14/2022 12:54 PM  Attending Physician: No att. providers found   Discharging Provider: Tyree Moya MD  Primary Care Provider: Primary Doctor Mariaelena      HPI:   Mr. Rivera is a 61 year old man with hypertension and tobacco abuse who presented to [facility] ER today for evaluation of left lateral chest pain.  He states this started abruptly 2 days ago while sitting in his recliner at home.  At that time the pain was sharp, non-radiating and 8/10 in intensity.  It was worth with deep inspiration and was not relieved with ibuprofen.  It lasted for about 3 hours then resolve don its own.  The pain recurred yesterday afternoon, but last night around 9PM it dramatically worsened and was 10/10 in intensity.  He has had some very milod associated shortness of breath as well as diminished oral intake over the last 3 hours.  He denies fevers, chills, nausea, vomiting, diarrhea, leg swelling, prolonged immobility, falls, trauma or unexplained weight loss.  He does smoke about a pack of cigarettes a day and strongly suspects he has a cancer because of early satiety and abdominal bloating which have been occurring for 6-8 months.  In the [facility] ER he was diagnosed with bilateral pulmonary embolism and was transferred here for admission and further workup.    Goals of Care Treatment Preferences:  Code Status: Full Code    Hospital Course By Problem:   * Bilateral pulmonary embolism  -Admitted to observation status  -Noted left lateral chest wall pain x 2 days prompting evaluation  -CTA chest showed multiple pulmonary emboli bilaterally with several small peripherally located wedge-shaped areas of increased density in the lateral aspect of the right middle lobe - likely pulmonary infarcts.   Moderate LLL consolidation.  Prominence of the body of the bilateral adrenals - characteristic of adrenal adenomas.  A 20 mm slightly exophytic hypodense mass off of the posterolateral aspect of the midpole of the right kidney  -No clear etiology of PE.  No prolonged immobility or recent trauma.  Patient suspects he has a cancer based on 6-8 months of abdominal bloating, but has no night sweats or unexplained weight loss.  -Echo showed EF 60%, grade II diastolic dysfunction, mild LAE and no PHTN  -Procalcitonin, rapid HIV, UDS all negative  -No obvious masses on ct abdomen and pelvis.  -Treated with lovenox while in house and transitioned to eliquis at discharge.  Will require at least 3-6 months, but ultimate duration to be determined by heme/onc (referral placed)  -Clinically stable without pain or shortness of breath at discharge.    Abdominal bloating  -Noted 6-8 months of abdominal bloating without pain and patient suspects he has a cancer.  No B symptoms (night sweats or weight loss).  -Has not had routine cancer screenings.  -Needs outpatient colonoscopy.  -Noted small right kidney exophytic mass felt to be a cyst.  Recommended f/u with pcp or urology to patient.  -CT A/P without obstruction or obvious mass - but did not use iv contrast due to severe national shortage.  -Needs to stop smoking.    Tobacco abuse  -Counselled on cessation 7 minutes.  -NRT ordered.    Essential hypertension  -Bp moderately elevated  -Continue home dose lisinopril    Class 1 obesity in adult  Body mass index is 34.84 kg/m². Morbid obesity complicates all aspects of disease management from diagnostic modalities to treatment. Weight loss encouraged and health benefits explained to patient.       Final Active Diagnoses:    Diagnosis Date Noted POA    PRINCIPAL PROBLEM:  Bilateral pulmonary embolism [I26.99] 05/13/2022 Yes    Abdominal bloating [R14.0] 05/13/2022 Yes    Tobacco abuse [Z72.0] 05/13/2022 Yes    Class 1 obesity in  adult [E66.9] 05/13/2022 Yes    Essential hypertension [I10] 05/13/2022 Yes      Problems Resolved During this Admission:       Discharged Condition: stable    Disposition: Home or Self Care    Follow Up:   Follow-up Information     [facility]. Schedule an appointment as soon as possible for a visit in 3 day(s).    Why: Please call [facility] by Monday 5/16/2022 to schedule a hospital follow up. Please bring all hospital discharge paperwork, your ID and insurance card.  Contact information:  [facility] [address],   [place] [address]   [phone]                     Patient Instructions:      Ambulatory referral/consult to Hematology / Oncology   Standing Status: Future   Referral Priority: Routine Referral Type: Consultation   Referral Reason: Specialty Services Required   Requested Specialty: Hematology and Oncology   Number of Visits Requested: 1     Diet Cardiac     Notify your health care provider if you experience any of the following:  increased confusion or weakness     Notify your health care provider if you experience any of the following:  persistent dizziness, light-headedness, or visual disturbances     Notify your health care provider if you experience any of the following:  worsening rash     Notify your health care provider if you experience any of the following:  severe persistent headache     Notify your health care provider if you experience any of the following:  difficulty breathing or increased cough     Notify your health care provider if you experience any of the following:  severe uncontrolled pain     Notify your health care provider if you experience any of the following:  persistent nausea and vomiting or diarrhea     Notify your health care provider if you experience any of the following:  temperature >100.4     Activity as tolerated       Significant Diagnostic Studies: Labs:   BMP:   Recent Labs   Lab 05/13/22  0547 05/14/22  0448    80    137    K 4.2 3.7    101   CO2 29 24   BUN 7 9   CREATININE 0.58 0.6   CALCIUM 8.8 9.1       Pending Diagnostic Studies:     Procedure Component Value Units Date/Time    PSA, Screening [[id number]] Collected: 05/13/22 1435    Order Status: Sent Lab Status: In process Updated: 05/14/22 0208    Specimen: Blood          Medications:  Reconciled Home Medications:      Medication List      START taking these medications    * apixaban 5 mg Tab  Commonly known as: ELIQUIS  Take 2 tablets (10 mg total) by mouth 2 (two) times daily. for 7 days     * apixaban 5 mg Tab  Commonly known as: ELIQUIS  Take 1 tablet (5 mg total) by mouth 2 (two) times daily.  Start taking on: May 22, 2022     LIDOcaine 5 %  Commonly known as: LIDODERM  Place 1 patch onto the skin once daily. Remove & Discard patch within 12 hours or as directed by MD     nicotine 14 mg/24 hr  Commonly known as: NICODERM CQ  Place 1 patch onto the skin once daily.  Start taking on: May 15, 2022                 CONTINUE taking these medications    diclofenac 50 MG EC tablet  Commonly known as: VOLTAREN  Take 1 tablet (50 mg total) by mouth 2 (two) times daily.     lisinopriL 20 MG tablet  Commonly known as: PRINIVIL,ZESTRIL  Take 20 mg by mouth 2 (two) times daily.            Indwelling Lines/Drains at time of discharge:   Lines/Drains/Airways     None                 Time spent on the discharge of patient:  35 minutes         Tyree Moya MD  Department of Hospital Medicine  [facility]

## 2022-05-16 ENCOUNTER — TELEPHONE (OUTPATIENT)
Dept: HEMATOLOGY/ONCOLOGY | Facility: CLINIC | Age: 62
End: 2022-05-16
Payer: MEDICAID

## 2022-05-16 NOTE — TELEPHONE ENCOUNTER
Tc to pt to advise him that our office is not contracted with his insurance  No response     Tc to pt to inquire if he has sought follow up care since hospital discharge and to advise him that our office is not contracted with his insurance  No response    TC to  Rise listed as contact in chart No response  Message left on VM requesting she ask pt  to contact office   to discuss if he has scheduled follow up care in the area he resides in [place] per Hospital physician Dr Moya instructions  Also advised sister that our office is not contracted with his insurance but we could assist in making an appointment  with a hematologist at [facility]     Letter  sent this date to pt

## 2022-05-18 LAB
BACTERIA BLD CULT: NORMAL
BACTERIA BLD CULT: NORMAL

## 2022-08-08 ENCOUNTER — HOSPITAL ENCOUNTER (EMERGENCY)
Facility: HOSPITAL | Age: 62
Discharge: HOME | End: 2022-08-08
Attending: EMERGENCY MEDICINE
Payer: MEDICAID

## 2022-08-08 VITALS
SYSTOLIC BLOOD PRESSURE: 173 MMHG | WEIGHT: 225 LBS | TEMPERATURE: 99 F | BODY MASS INDEX: 35.24 KG/M2 | RESPIRATION RATE: 20 BRPM | DIASTOLIC BLOOD PRESSURE: 69 MMHG | OXYGEN SATURATION: 99 % | HEART RATE: 95 BPM

## 2022-08-08 DIAGNOSIS — J90 PLEURAL EFFUSION ON LEFT: ICD-10-CM

## 2022-08-08 DIAGNOSIS — Z86.711 HISTORY OF PULMONARY EMBOLISM: ICD-10-CM

## 2022-08-08 DIAGNOSIS — R07.9 CHEST PAIN: ICD-10-CM

## 2022-08-08 DIAGNOSIS — R07.89 CHEST WALL PAIN: Primary | ICD-10-CM

## 2022-08-08 LAB
ALBUMIN SERPL BCP-MCNC: 3.7 G/DL (ref 3.5–5.2)
ALP SERPL-CCNC: 85 U/L (ref 55–135)
ALT SERPL W/O P-5'-P-CCNC: 11 U/L (ref 10–44)
ANION GAP SERPL CALC-SCNC: 6 MMOL/L (ref 8–16)
AST SERPL-CCNC: 18 U/L (ref 10–40)
BILIRUB SERPL-MCNC: 0.6 MG/DL (ref 0.1–1)
BNP SERPL-MCNC: 53 PG/ML (ref 0–99)
BUN SERPL-MCNC: 8 MG/DL (ref 6–20)
CALCIUM SERPL-MCNC: 9.6 MG/DL (ref 8.7–10.5)
CHLORIDE SERPL-SCNC: 103 MMOL/L (ref 95–110)
CO2 SERPL-SCNC: 30 MMOL/L (ref 23–29)
CREAT SERPL-MCNC: 0.8 MG/DL (ref 0.5–1.4)
ERYTHROCYTE [DISTWIDTH] IN BLOOD BY AUTOMATED COUNT: 12.7 % (ref 11.5–14.5)
EST. GFR  (NO RACE VARIABLE): >60 ML/MIN/1.73 M^2
GLUCOSE SERPL-MCNC: 114 MG/DL (ref 70–110)
HCT VFR BLD AUTO: 45.2 % (ref 40–54)
HGB BLD-MCNC: 16.1 G/DL (ref 14–18)
MCH RBC QN AUTO: 36 PG (ref 27–31)
MCHC RBC AUTO-ENTMCNC: 35.6 G/DL (ref 32–36)
MCV RBC AUTO: 101 FL (ref 82–98)
PLATELET # BLD AUTO: 231 K/UL (ref 150–450)
PMV BLD AUTO: 10.4 FL (ref 9.2–12.9)
POTASSIUM SERPL-SCNC: 4.5 MMOL/L (ref 3.5–5.1)
PROT SERPL-MCNC: 7.6 G/DL (ref 6–8.4)
RBC # BLD AUTO: 4.47 M/UL (ref 4.6–6.2)
SODIUM SERPL-SCNC: 139 MMOL/L (ref 136–145)
TROPONIN I SERPL DL<=0.01 NG/ML-MCNC: 0.01 NG/ML (ref 0–0.03)
WBC # BLD AUTO: 7.81 K/UL (ref 3.9–12.7)

## 2022-08-08 PROCEDURE — 96374 THER/PROPH/DIAG INJ IV PUSH: CPT | Mod: 59

## 2022-08-08 PROCEDURE — 93010 ELECTROCARDIOGRAM REPORT: CPT | Mod: ,,, | Performed by: INTERNAL MEDICINE

## 2022-08-08 PROCEDURE — 93010 EKG 12-LEAD: ICD-10-PCS | Mod: ,,, | Performed by: INTERNAL MEDICINE

## 2022-08-08 PROCEDURE — 85027 COMPLETE CBC AUTOMATED: CPT | Performed by: EMERGENCY MEDICINE

## 2022-08-08 PROCEDURE — 83880 ASSAY OF NATRIURETIC PEPTIDE: CPT | Performed by: EMERGENCY MEDICINE

## 2022-08-08 PROCEDURE — 84484 ASSAY OF TROPONIN QUANT: CPT | Performed by: EMERGENCY MEDICINE

## 2022-08-08 PROCEDURE — 25500020 PHARM REV CODE 255: Performed by: EMERGENCY MEDICINE

## 2022-08-08 PROCEDURE — 96375 TX/PRO/DX INJ NEW DRUG ADDON: CPT

## 2022-08-08 PROCEDURE — 99285 EMERGENCY DEPT VISIT HI MDM: CPT | Mod: 25

## 2022-08-08 PROCEDURE — 80053 COMPREHEN METABOLIC PANEL: CPT | Performed by: EMERGENCY MEDICINE

## 2022-08-08 PROCEDURE — 63600175 PHARM REV CODE 636 W HCPCS: Performed by: EMERGENCY MEDICINE

## 2022-08-08 PROCEDURE — 25000003 PHARM REV CODE 250: Performed by: EMERGENCY MEDICINE

## 2022-08-08 PROCEDURE — 93005 ELECTROCARDIOGRAM TRACING: CPT

## 2022-08-08 RX ORDER — MORPHINE SULFATE 2 MG/ML
2 INJECTION, SOLUTION INTRAMUSCULAR; INTRAVENOUS
Status: COMPLETED | OUTPATIENT
Start: 2022-08-08 | End: 2022-08-08

## 2022-08-08 RX ORDER — ASPIRIN 325 MG
325 TABLET ORAL
Status: COMPLETED | OUTPATIENT
Start: 2022-08-08 | End: 2022-08-08

## 2022-08-08 RX ORDER — ONDANSETRON 2 MG/ML
4 INJECTION INTRAMUSCULAR; INTRAVENOUS
Status: COMPLETED | OUTPATIENT
Start: 2022-08-08 | End: 2022-08-08

## 2022-08-08 RX ADMIN — ASPIRIN 325 MG ORAL TABLET 325 MG: 325 PILL ORAL at 08:08

## 2022-08-08 RX ADMIN — ONDANSETRON HYDROCHLORIDE 4 MG: 2 INJECTION, SOLUTION INTRAMUSCULAR; INTRAVENOUS at 08:08

## 2022-08-08 RX ADMIN — IOHEXOL 100 ML: 350 INJECTION, SOLUTION INTRAVENOUS at 09:08

## 2022-08-08 RX ADMIN — MORPHINE SULFATE 2 MG: 2 INJECTION, SOLUTION INTRAMUSCULAR; INTRAVENOUS at 08:08

## 2022-08-08 NOTE — ED PROVIDER NOTES
"Encounter Date: 8/8/2022       History     Chief Complaint   Patient presents with    Abdominal Pain     LUQ abd pain that began last night, pt states " feels like when I had blood clot in my leg" pt had blood clot removed in June from left leg,  increased pain with coughing, denies SOB/CP/N/V/leg pain      Trevor Rivera is a 62 y.o. male who  has a past medical history of Chronic foot pain, Hypertension, and Tobacco abuse.    The patient presents to the ED due to chest pain.   Patient reports symptoms started last night.  He describes a pain in the left lower portion of his chest wall that is worse with deep breathing.  He describes the pain as sharp and tight.  He reports associated cough, but denies any fever or shortness of breath.  He has a history of DVT and PE diagnosed in May of this year.  He was admitted to the hospital and started on Eliquis.  He states he ran out of Eliquis about 1.5 weeks ago, so he has not been taking it.  He states he developed COVID after discharge from the hospital, so he has been unable to follow up with any physicians, including a PCP or cardiologist.          Review of patient's allergies indicates:  No Known Allergies  Past Medical History:   Diagnosis Date    Chronic foot pain     Hypertension     Tobacco abuse      Past Surgical History:   Procedure Laterality Date    FOOT SURGERY Left     SPINAL FUSION       Family History   Problem Relation Age of Onset    No Known Problems Mother     No Known Problems Father      Social History     Tobacco Use    Smoking status: Current Every Day Smoker     Packs/day: 1.00     Types: Cigarettes    Smokeless tobacco: Never Used   Substance Use Topics    Alcohol use: Yes     Comment: twice a week    Drug use: No     Review of Systems   Constitutional: Negative for chills and fever.   HENT: Negative for sore throat.    Respiratory: Negative for shortness of breath.    Cardiovascular: Positive for chest pain. "   Gastrointestinal: Negative for abdominal pain, constipation, diarrhea, nausea and vomiting.   Genitourinary: Negative for dysuria, frequency and urgency.   Musculoskeletal: Negative for back pain.   Skin: Negative for rash and wound.   Neurological: Negative for weakness.   Hematological: Does not bruise/bleed easily.   Psychiatric/Behavioral: Negative for agitation, behavioral problems and confusion.       Physical Exam     Initial Vitals [08/08/22 0709]   BP Pulse Resp Temp SpO2   (!) 172/90 95 16 98.5 °F (36.9 °C) 99 %      MAP       --         Physical Exam    Nursing note and vitals reviewed.  Constitutional: He appears well-developed and well-nourished. He is not diaphoretic. No distress.   Well-appearing, no distress.   HENT:   Head: Normocephalic and atraumatic.   Mouth/Throat: Oropharynx is clear and moist.   Eyes: EOM are normal. Pupils are equal, round, and reactive to light.   Neck: No tracheal deviation present.   Cardiovascular: Normal rate, regular rhythm, normal heart sounds and intact distal pulses.   Pulmonary/Chest: Breath sounds normal. No stridor. No respiratory distress.   Abdominal: Abdomen is soft. He exhibits no distension and no mass. There is no abdominal tenderness.   Musculoskeletal:         General: No edema. Normal range of motion.     Neurological: He is alert and oriented to person, place, and time. No cranial nerve deficit or sensory deficit.   Skin: Skin is warm and dry. Capillary refill takes less than 2 seconds. No rash noted.   Psychiatric: He has a normal mood and affect. His behavior is normal. Thought content normal.         ED Course   Procedures  Labs Reviewed   CBC WITHOUT DIFFERENTIAL - Abnormal; Notable for the following components:       Result Value    RBC 4.47 (*)      (*)     MCH 36.0 (*)     All other components within normal limits   COMPREHENSIVE METABOLIC PANEL - Abnormal; Notable for the following components:    CO2 30 (*)     Glucose 114 (*)     Anion  Gap 6 (*)     All other components within normal limits   TROPONIN I   B-TYPE NATRIURETIC PEPTIDE     EKG Readings: (Independently Interpreted)   Initial Reading: No STEMI. Previous EKG: Compared with most recent EKG Rhythm: Normal Sinus Rhythm.   Normal sinus rhythm, rate 75, no ST changes or evidence of ischemia, normal intervals.  Stable from May 2022.        Imaging Results          CTA Chest Non-Coronary (PE Study) (Final result)  Result time 08/08/22 09:39:01    Final result by Pattie Nixon MD (08/08/22 09:39:01)                 Impression:      1. No acute cardiopulmonary disease.  Specifically, no PE.  2. Trace left pleural effusion.  3. Two left adrenal adenomas.      Electronically signed by: Pattie Nixon  Date:    08/08/2022  Time:    09:39             Narrative:    EXAMINATION:  CTA CHEST NON CORONARY    CLINICAL HISTORY:  Pulmonary embolism (PE) suspected, high prob;    TECHNIQUE:  Low dose axial images, sagittal and coronal reformations were obtained from the thoracic inlet to the lung bases following the IV administration of 100 mL of Omnipaque 350.  Contrast timing was optimized to evaluate the pulmonary arteries.  MIP images were performed.    COMPARISON:  Chest radiograph performed earlier today, 05/13/2022    FINDINGS:  Exam quality: Satisfactory.    Pulmonary embolism: No acute or chronic pulmonary embolism.    Central pulmonary arteries: Normal caliber.    Aorta: Normal caliber.    Heart: No right heart strain. Normal size.    Coronary arteries: No calcifications.    Pericardium: Normal. No effusion, thickening, or calcification.    Support tubes and lines: None.    Base of neck/thyroid: Normal.    Lymph nodes: No supraclavicular, axillary, internal mammary, mediastinal, or hilar adenopathy.    Esophagus: Normal.    Pleura: Trace left pleural effusion    Upper abdomen: A 17 mm fat containing left adrenal lesion is present that is compatible with an adenoma.  A 2nd adenoma is present in  the left adrenal gland that measures 19 x 13 mm.    Body wall: Unremarkable    Airways: Normal.    Lungs: Paraseptal emphysema is present.    Bones: No focal lesion.                               X-Ray Chest AP Portable (Final result)  Result time 08/08/22 08:26:06    Final result by Pattie Nixon MD (08/08/22 08:26:06)                 Impression:      No acute cardiopulmonary disease.      Electronically signed by: Pattie Nixon  Date:    08/08/2022  Time:    08:26             Narrative:    EXAMINATION:  XR CHEST AP PORTABLE    CLINICAL HISTORY:  chest pain;    TECHNIQUE:  Single frontal view of the chest was performed.    COMPARISON:  05/13/2022    FINDINGS:  The lungs are clear.  No pleural effusion.  The cardiomediastinal silhouette is within normal limits.  The bones and soft tissues are unremarkable.                                 Medications   aspirin tablet 325 mg (325 mg Oral Given 8/8/22 0823)   morphine injection 2 mg (2 mg Intravenous Given 8/8/22 0824)   ondansetron injection 4 mg (4 mg Intravenous Given 8/8/22 0824)   iohexoL (OMNIPAQUE 350) injection 100 mL (100 mLs Intravenous Given 8/8/22 0913)     Medical Decision Making:   History:   Old Medical Records: I decided to obtain old medical records.  Old Records Summarized: records from clinic visits and other records.       <> Summary of Records: Patient seen in ED 5/13/22, diagnosed with PE, admitted and placed on Lovenox, discharged on Eliquis.   Initial Assessment:   61 yo M with chest pain.  Describes pain as similar in character to prior episode of DVT/PE.  Will obtain cardiac evaluation, labs, CTA chest, and continue to monitor.  Differential Diagnosis:   Differential Diagnosis includes, but is not limited to:  ACS/MI, PE, aortic dissection, pneumothorax, cardiac tamponade, pericarditis/myocarditis, pneumonia, infection/abscess, lung mass, trauma/fracture, costochondritis/pleurisy, MSK pain/contusion, GERD, biliary disease,  pancreatitis, anemia    Clinical Tests:   Lab Tests: Ordered and Reviewed  Radiological Study: Reviewed and Ordered  Medical Tests: Ordered and Reviewed  ED Management:  EKG without ischemia.  CXR clear.  Labs unremarkable, troponin negative.  CTA obtained, negative for PE.  Reveals trace left pleural effusion.  Patient informed of findings and reassured.  No indication for further evaluation or admission at this time.  Will place ambulatory referrals to PCP and Cardiology for outpatient follow-up.  Given strict precautions including worsening symptoms or any other concerns.    On re-evaluation, the patient's status has improved.  After complete ED evaluation, clinical impression is most consistent with left pleural effusion, chest wall pain.  PCP follow-up within 2-3 days was recommended.    After taking into careful account the patient's history, physical exam findings, as well as empirical and objective data obtained throughout ED workup, I feel no emergent medical condition has been identified. No further evaluation or admission was felt to be required, and the patient is stable for discharge from the ED. The patient and any additional family present were updated with test results, overall clinical impression, and recommended further plan of care, including discharge instructions as provided and outpatient follow-up for continued evaluation and management as needed. All questions were answered. The patient expressed understanding and agreed with current plan for discharge and follow-up plan of care. Strict ED return precautions were provided, including return/worsening of current symptoms, new symptoms, or any other concerns.                        Clinical Impression:   Final diagnoses:  [R07.9] Chest pain  [R07.89] Chest wall pain (Primary)  [J90] Pleural effusion on left  [Z86.711] History of pulmonary embolism          ED Disposition Condition    Discharge Stable        ED Prescriptions     None         Follow-up Information     Follow up With Specialties Details Why Contact Info Additional Information    [facility] Family Medicine Schedule an appointment as soon as possible for a visit   [address], [address]  [place] [address] [phone] Please park in [address] or [address] and use [address]. Take Medical Office Bldg. elevators.           Nate Mireles MD  08/08/22 1357

## 2022-08-08 NOTE — ED NOTES
Pt. C/o pain to lt. Anterior lower ribs since yesterday evening. The pain does not radiate. Denies sob. Denies pain, swelling in legs. Pt. Was diagnosed with pulmonary embolism approx. 3 months ago and was discharged on Eliquis. He ran out of medication and has not taken in over a week. He also recently had Covid with mild symptoms which are resolving.

## 2022-08-09 ENCOUNTER — PATIENT OUTREACH (OUTPATIENT)
Dept: EMERGENCY MEDICINE | Facility: HOSPITAL | Age: 62
End: 2022-08-09
Payer: MEDICAID

## 2022-08-16 ENCOUNTER — PATIENT OUTREACH (OUTPATIENT)
Dept: EMERGENCY MEDICINE | Facility: HOSPITAL | Age: 62
End: 2022-08-16
Payer: MEDICAID

## 2022-10-15 PROBLEM — Z86.711 HISTORY OF PULMONARY EMBOLISM: Status: ACTIVE | Noted: 2022-05-13

## 2022-10-15 PROBLEM — Z86.718 HISTORY OF DEEP VEIN THROMBOSIS: Status: ACTIVE | Noted: 2022-10-15

## 2022-10-15 PROBLEM — F17.210 NICOTINE DEPENDENCE, CIGARETTES, UNCOMPLICATED: Status: ACTIVE | Noted: 2022-10-15

## 2022-10-15 PROBLEM — I70.0 ATHEROSCLEROSIS OF AORTA: Status: ACTIVE | Noted: 2022-10-15

## 2023-07-13 ENCOUNTER — TELEPHONE (OUTPATIENT)
Dept: SURGERY | Facility: CLINIC | Age: 63
End: 2023-07-13
Payer: MEDICAID

## 2023-07-13 NOTE — TELEPHONE ENCOUNTER
Patient scheduled to see Dr. Valles on tomorrow for temple artery biopsy.    Reached out to patient to inform that Dr. Valles does not do temple artery biopsies. Patient states he has been on a steroid for a while and he was told he needed a temple artery biopsy and he was scheduled here.   Patient states he will contact the office who recommended our office and call us back.    Patient currently on schedule with Dr. Valles.

## 2023-07-13 NOTE — TELEPHONE ENCOUNTER
Spoke with Nimco from Dr. Seo she states patient needs temple artery biopsy to find out if patient has arteritis. I informed Nimco that Dr. Valles may not do this type of procedure and that the patient may need to see a provider in vascular or ENT.     Nimco states she will reach out to the patient.    Patient on schedule with Dr. Valles.

## 2023-07-14 ENCOUNTER — ANESTHESIA EVENT (OUTPATIENT)
Dept: SURGERY | Facility: HOSPITAL | Age: 63
End: 2023-07-14
Payer: MEDICAID

## 2023-07-14 ENCOUNTER — OFFICE VISIT (OUTPATIENT)
Dept: SURGERY | Facility: CLINIC | Age: 63
End: 2023-07-14
Payer: MEDICAID

## 2023-07-14 VITALS
HEART RATE: 78 BPM | BODY MASS INDEX: 36.22 KG/M2 | WEIGHT: 230.81 LBS | HEIGHT: 67 IN | OXYGEN SATURATION: 97 % | DIASTOLIC BLOOD PRESSURE: 97 MMHG | SYSTOLIC BLOOD PRESSURE: 167 MMHG

## 2023-07-14 DIAGNOSIS — M31.6 GIANT CELL ARTERITIS: ICD-10-CM

## 2023-07-14 DIAGNOSIS — I77.6 ARTERITIS: Primary | ICD-10-CM

## 2023-07-14 PROCEDURE — 3077F PR MOST RECENT SYSTOLIC BLOOD PRESSURE >= 140 MM HG: ICD-10-PCS | Mod: CPTII,,, | Performed by: SURGERY

## 2023-07-14 PROCEDURE — 3077F SYST BP >= 140 MM HG: CPT | Mod: CPTII,,, | Performed by: SURGERY

## 2023-07-14 PROCEDURE — 99204 PR OFFICE/OUTPT VISIT, NEW, LEVL IV, 45-59 MIN: ICD-10-PCS | Mod: S$PBB,,, | Performed by: SURGERY

## 2023-07-14 PROCEDURE — 99999 PR PBB SHADOW E&M-EST. PATIENT-LVL III: ICD-10-PCS | Mod: PBBFAC,,, | Performed by: SURGERY

## 2023-07-14 PROCEDURE — 4010F PR ACE/ARB THEARPY RXD/TAKEN: ICD-10-PCS | Mod: CPTII,,, | Performed by: SURGERY

## 2023-07-14 PROCEDURE — 4010F ACE/ARB THERAPY RXD/TAKEN: CPT | Mod: CPTII,,, | Performed by: SURGERY

## 2023-07-14 PROCEDURE — 3008F BODY MASS INDEX DOCD: CPT | Mod: CPTII,,, | Performed by: SURGERY

## 2023-07-14 PROCEDURE — 99204 OFFICE O/P NEW MOD 45 MIN: CPT | Mod: S$PBB,,, | Performed by: SURGERY

## 2023-07-14 PROCEDURE — 99999 PR PBB SHADOW E&M-EST. PATIENT-LVL III: CPT | Mod: PBBFAC,,, | Performed by: SURGERY

## 2023-07-14 PROCEDURE — 3080F DIAST BP >= 90 MM HG: CPT | Mod: CPTII,,, | Performed by: SURGERY

## 2023-07-14 PROCEDURE — 3008F PR BODY MASS INDEX (BMI) DOCUMENTED: ICD-10-PCS | Mod: CPTII,,, | Performed by: SURGERY

## 2023-07-14 PROCEDURE — 3080F PR MOST RECENT DIASTOLIC BLOOD PRESSURE >= 90 MM HG: ICD-10-PCS | Mod: CPTII,,, | Performed by: SURGERY

## 2023-07-14 PROCEDURE — 99213 OFFICE O/P EST LOW 20 MIN: CPT | Mod: PBBFAC | Performed by: SURGERY

## 2023-07-14 RX ORDER — MELOXICAM 7.5 MG/1
7.5 TABLET ORAL 2 TIMES DAILY
COMMUNITY
Start: 2023-07-14

## 2023-07-14 RX ORDER — LIDOCAINE HYDROCHLORIDE 10 MG/ML
1 INJECTION, SOLUTION EPIDURAL; INFILTRATION; INTRACAUDAL; PERINEURAL ONCE
Status: CANCELLED | OUTPATIENT
Start: 2023-07-14 | End: 2023-07-14

## 2023-07-14 RX ORDER — GABAPENTIN 100 MG/1
100 CAPSULE ORAL 2 TIMES DAILY
COMMUNITY
Start: 2023-06-30

## 2023-07-14 RX ORDER — PREDNISONE 20 MG/1
40 TABLET ORAL 2 TIMES DAILY
COMMUNITY
Start: 2023-07-14

## 2023-07-14 RX ORDER — SODIUM CHLORIDE 9 MG/ML
INJECTION, SOLUTION INTRAVENOUS CONTINUOUS
Status: CANCELLED | OUTPATIENT
Start: 2023-07-14

## 2023-07-14 NOTE — PROGRESS NOTES
History and Physical Exam    Patient ID: Trevor Rivera is a 63 y.o. male.    Chief Complaint: Consult      HPI:  62 y/o man with history of right sided vision changes      Review of Systems   Constitutional:  Negative for chills and unexpected weight change.   HENT:  Negative for congestion, ear pain and sore throat.    Eyes:  Positive for visual disturbance. Negative for pain and redness.   Respiratory:  Negative for cough and shortness of breath.    Cardiovascular:  Negative for chest pain and palpitations.   Gastrointestinal:  Negative for abdominal distention, abdominal pain and constipation.   Endocrine: Negative for cold intolerance and heat intolerance.   Genitourinary:  Negative for dysuria and frequency.   Musculoskeletal:  Negative for back pain and neck pain.   Skin:  Negative for pallor and rash.   Neurological:  Negative for syncope, light-headedness and headaches.   Hematological:  Negative for adenopathy. Does not bruise/bleed easily.   Psychiatric/Behavioral:  Negative for confusion and hallucinations.      Current Outpatient Medications   Medication Sig Dispense Refill    apixaban (ELIQUIS) 5 mg Tab Take 1 tablet (5 mg total) by mouth 2 (two) times daily. 60 tablet 1    diclofenac (VOLTAREN) 50 MG EC tablet Take 1 tablet (50 mg total) by mouth 2 (two) times daily. 21 tablet 0    gabapentin (NEURONTIN) 100 MG capsule       LIDOcaine (LIDODERM) 5 % Place 1 patch onto the skin once daily. Remove & Discard patch within 12 hours or as directed by MD 30 patch 1    lisinopril (PRINIVIL,ZESTRIL) 20 MG tablet Take 20 mg by mouth 2 (two) times daily.      meloxicam (MOBIC) 7.5 MG tablet Take 7.5 mg by mouth 2 (two) times daily.      nicotine (NICODERM CQ) 14 mg/24 hr Place 1 patch onto the skin once daily. 30 patch 1    predniSONE (DELTASONE) 20 MG tablet Take 40 mg by mouth 2 (two) times daily.       No current facility-administered medications for this visit.       Review of patient's allergies  indicates:  No Known Allergies    Past Medical History:   Diagnosis Date    Chronic foot pain     Hypertension     Tobacco abuse        Past Surgical History:   Procedure Laterality Date    FOOT SURGERY Left     SPINAL FUSION         Family History   Problem Relation Age of Onset    No Known Problems Mother     No Known Problems Father        Social History     Socioeconomic History    Marital status:    Tobacco Use    Smoking status: Every Day     Packs/day: 1.00     Types: Cigarettes    Smokeless tobacco: Never   Substance and Sexual Activity    Alcohol use: Yes     Comment: twice a week    Drug use: No       Vitals:    07/14/23 1320   BP: (!) 167/97   Pulse: 78       Physical Exam  Constitutional:       Appearance: He is well-developed.   HENT:      Head: Normocephalic and atraumatic.   Eyes:      Pupils: Pupils are equal, round, and reactive to light.   Cardiovascular:      Rate and Rhythm: Normal rate and regular rhythm.      Heart sounds: No murmur heard.  Pulmonary:      Effort: Pulmonary effort is normal.      Breath sounds: Normal breath sounds. No wheezing.   Abdominal:      General: There is no distension.      Palpations: Abdomen is soft.      Tenderness: There is no abdominal tenderness.   Musculoskeletal:         General: Normal range of motion.      Cervical back: Normal range of motion and neck supple.   Skin:     General: Skin is warm and dry.      Capillary Refill: Capillary refill takes less than 2 seconds.      Findings: No rash.   Neurological:      Mental Status: He is alert and oriented to person, place, and time.   Psychiatric:         Judgment: Judgment normal.       Assessment & Plan:   Request for temporal art biopsy, plan right sided biopsy     Risks, benefits, alternatives to the procedure were discussed with the patient, who appears to understand and agree with the treatment plan.

## 2023-07-14 NOTE — H&P (VIEW-ONLY)
History and Physical Exam    Patient ID: Trevor Rivera is a 63 y.o. male.    Chief Complaint: Consult      HPI:  64 y/o man with history of right sided vision changes      Review of Systems   Constitutional:  Negative for chills and unexpected weight change.   HENT:  Negative for congestion, ear pain and sore throat.    Eyes:  Positive for visual disturbance. Negative for pain and redness.   Respiratory:  Negative for cough and shortness of breath.    Cardiovascular:  Negative for chest pain and palpitations.   Gastrointestinal:  Negative for abdominal distention, abdominal pain and constipation.   Endocrine: Negative for cold intolerance and heat intolerance.   Genitourinary:  Negative for dysuria and frequency.   Musculoskeletal:  Negative for back pain and neck pain.   Skin:  Negative for pallor and rash.   Neurological:  Negative for syncope, light-headedness and headaches.   Hematological:  Negative for adenopathy. Does not bruise/bleed easily.   Psychiatric/Behavioral:  Negative for confusion and hallucinations.      Current Outpatient Medications   Medication Sig Dispense Refill    apixaban (ELIQUIS) 5 mg Tab Take 1 tablet (5 mg total) by mouth 2 (two) times daily. 60 tablet 1    diclofenac (VOLTAREN) 50 MG EC tablet Take 1 tablet (50 mg total) by mouth 2 (two) times daily. 21 tablet 0    gabapentin (NEURONTIN) 100 MG capsule       LIDOcaine (LIDODERM) 5 % Place 1 patch onto the skin once daily. Remove & Discard patch within 12 hours or as directed by MD 30 patch 1    lisinopril (PRINIVIL,ZESTRIL) 20 MG tablet Take 20 mg by mouth 2 (two) times daily.      meloxicam (MOBIC) 7.5 MG tablet Take 7.5 mg by mouth 2 (two) times daily.      nicotine (NICODERM CQ) 14 mg/24 hr Place 1 patch onto the skin once daily. 30 patch 1    predniSONE (DELTASONE) 20 MG tablet Take 40 mg by mouth 2 (two) times daily.       No current facility-administered medications for this visit.       Review of patient's allergies  indicates:  No Known Allergies    Past Medical History:   Diagnosis Date    Chronic foot pain     Hypertension     Tobacco abuse        Past Surgical History:   Procedure Laterality Date    FOOT SURGERY Left     SPINAL FUSION         Family History   Problem Relation Age of Onset    No Known Problems Mother     No Known Problems Father        Social History     Socioeconomic History    Marital status:    Tobacco Use    Smoking status: Every Day     Packs/day: 1.00     Types: Cigarettes    Smokeless tobacco: Never   Substance and Sexual Activity    Alcohol use: Yes     Comment: twice a week    Drug use: No       Vitals:    07/14/23 1320   BP: (!) 167/97   Pulse: 78       Physical Exam  Constitutional:       Appearance: He is well-developed.   HENT:      Head: Normocephalic and atraumatic.   Eyes:      Pupils: Pupils are equal, round, and reactive to light.   Cardiovascular:      Rate and Rhythm: Normal rate and regular rhythm.      Heart sounds: No murmur heard.  Pulmonary:      Effort: Pulmonary effort is normal.      Breath sounds: Normal breath sounds. No wheezing.   Abdominal:      General: There is no distension.      Palpations: Abdomen is soft.      Tenderness: There is no abdominal tenderness.   Musculoskeletal:         General: Normal range of motion.      Cervical back: Normal range of motion and neck supple.   Skin:     General: Skin is warm and dry.      Capillary Refill: Capillary refill takes less than 2 seconds.      Findings: No rash.   Neurological:      Mental Status: He is alert and oriented to person, place, and time.   Psychiatric:         Judgment: Judgment normal.       Assessment & Plan:   Request for temporal art biopsy, plan right sided biopsy     Risks, benefits, alternatives to the procedure were discussed with the patient, who appears to understand and agree with the treatment plan.

## 2023-07-18 ENCOUNTER — HOSPITAL ENCOUNTER (OUTPATIENT)
Dept: PREADMISSION TESTING | Facility: HOSPITAL | Age: 63
Discharge: HOME | End: 2023-07-18
Attending: SURGERY
Payer: MEDICAID

## 2023-07-18 VITALS
RESPIRATION RATE: 16 BRPM | HEART RATE: 69 BPM | DIASTOLIC BLOOD PRESSURE: 86 MMHG | OXYGEN SATURATION: 98 % | TEMPERATURE: 97 F | HEIGHT: 67 IN | BODY MASS INDEX: 35.84 KG/M2 | WEIGHT: 228.38 LBS | SYSTOLIC BLOOD PRESSURE: 173 MMHG

## 2023-07-18 DIAGNOSIS — Z01.818 PREOPERATIVE TESTING: Primary | ICD-10-CM

## 2023-07-18 LAB
ALBUMIN SERPL BCP-MCNC: 3.7 G/DL (ref 3.5–5.2)
ALP SERPL-CCNC: 65 U/L (ref 55–135)
ALT SERPL W/O P-5'-P-CCNC: 26 U/L (ref 10–44)
ANION GAP SERPL CALC-SCNC: 7 MMOL/L (ref 8–16)
AST SERPL-CCNC: 12 U/L (ref 10–40)
BASOPHILS # BLD AUTO: 0.01 K/UL (ref 0–0.2)
BASOPHILS NFR BLD: 0.1 % (ref 0–1.9)
BILIRUB SERPL-MCNC: 0.4 MG/DL (ref 0.1–1)
BUN SERPL-MCNC: 21 MG/DL (ref 8–23)
CALCIUM SERPL-MCNC: 9.2 MG/DL (ref 8.7–10.5)
CHLORIDE SERPL-SCNC: 107 MMOL/L (ref 95–110)
CO2 SERPL-SCNC: 27 MMOL/L (ref 23–29)
CREAT SERPL-MCNC: 0.9 MG/DL (ref 0.5–1.4)
DIFFERENTIAL METHOD: ABNORMAL
EOSINOPHIL # BLD AUTO: 0 K/UL (ref 0–0.5)
EOSINOPHIL NFR BLD: 0 % (ref 0–8)
ERYTHROCYTE [DISTWIDTH] IN BLOOD BY AUTOMATED COUNT: 12.2 % (ref 11.5–14.5)
EST. GFR  (NO RACE VARIABLE): >60 ML/MIN/1.73 M^2
GLUCOSE SERPL-MCNC: 125 MG/DL (ref 70–110)
HCT VFR BLD AUTO: 43.3 % (ref 40–54)
HGB BLD-MCNC: 14.7 G/DL (ref 14–18)
IMM GRANULOCYTES # BLD AUTO: 0.03 K/UL (ref 0–0.04)
IMM GRANULOCYTES NFR BLD AUTO: 0.3 % (ref 0–0.5)
LYMPHOCYTES # BLD AUTO: 0.8 K/UL (ref 1–4.8)
LYMPHOCYTES NFR BLD: 8.5 % (ref 18–48)
MCH RBC QN AUTO: 32.5 PG (ref 27–31)
MCHC RBC AUTO-ENTMCNC: 33.9 G/DL (ref 32–36)
MCV RBC AUTO: 96 FL (ref 82–98)
MONOCYTES # BLD AUTO: 0.2 K/UL (ref 0.3–1)
MONOCYTES NFR BLD: 2.7 % (ref 4–15)
NEUTROPHILS # BLD AUTO: 7.9 K/UL (ref 1.8–7.7)
NEUTROPHILS NFR BLD: 88.4 % (ref 38–73)
NRBC BLD-RTO: 0 /100 WBC
PLATELET # BLD AUTO: 221 K/UL (ref 150–450)
PMV BLD AUTO: 10.5 FL (ref 9.2–12.9)
POTASSIUM SERPL-SCNC: 4.5 MMOL/L (ref 3.5–5.1)
PROT SERPL-MCNC: 6.9 G/DL (ref 6–8.4)
RBC # BLD AUTO: 4.52 M/UL (ref 4.6–6.2)
SODIUM SERPL-SCNC: 141 MMOL/L (ref 136–145)
WBC # BLD AUTO: 8.91 K/UL (ref 3.9–12.7)

## 2023-07-18 PROCEDURE — 93005 ELECTROCARDIOGRAM TRACING: CPT

## 2023-07-18 PROCEDURE — 36415 COLL VENOUS BLD VENIPUNCTURE: CPT | Performed by: SURGERY

## 2023-07-18 PROCEDURE — 93010 ELECTROCARDIOGRAM REPORT: CPT | Mod: ,,, | Performed by: INTERNAL MEDICINE

## 2023-07-18 PROCEDURE — 93010 EKG 12-LEAD: ICD-10-PCS | Mod: ,,, | Performed by: INTERNAL MEDICINE

## 2023-07-18 PROCEDURE — 80053 COMPREHEN METABOLIC PANEL: CPT | Performed by: SURGERY

## 2023-07-18 PROCEDURE — 85025 COMPLETE CBC W/AUTO DIFF WBC: CPT | Performed by: SURGERY

## 2023-07-18 NOTE — DISCHARGE INSTRUCTIONS
Before 7 AM, enter through the Emergency Entrance..   After 7 AM enter through the Main Entrance.      Your procedure  is scheduled for __7/21/2023________.    Call [phone] between 2pm and 5pm on __7/20/2023_____to find out your arrival time for the day of surgery.    You may have one visitor.  No children allowed.     You will be going to the Same Day Surgery Unit on the 2nd floor of the hospital.    Important instructions:  Do not eat anything after midnight.  You may have plain water, non carbonated.  You may also have Gatorade or Powerade after midnight.    Stop all fluids 2 hours before your surgery.    It is okay to brush your teeth.  Do not have gum, candy or mints.    SEE MEDICATION SHEET.   TAKE MEDICATIONS AS DIRECTED WITH SIPS OF WATER.      STOP taking Aspirin, Ibuprofen,  Advil, Motrin, Mobic(meloxicam), Aleve (naproxen), Fish oil, and Vitamin E for at least 7 days before your surgery.     You may take Tylenol if needed which is not a blood thinner.    Please shower the night before and the morning of your surgery.      Use Chlorhexidine soap as instructed by your pre op nurse.   Please place clean linens on your bed the night before surgery. Please wear fresh clean clothing after each shower.    No shaving of procedural area at least 4-5 days before surgery due to increased risk of skin irritation and/or possible infection.    Contact lenses and removable denture work may not be worn during your procedure.    You may wear deodorant only. If you are having breast surgery, do not wear deodorant on the operative side.    Do not wear powder, body lotion, perfume/cologne or make-up.    Do not wear any jewelry or have any metal on your body.    You will be asked to remove any dentures or partials for the procedure.    If you are going home on the same day of surgery, you must arrange for a family member or a friend to drive you home.  Public transportation is prohibited.  You will not be able  to drive home if you were given anesthesia or sedation.    Patients who want to have their Post-op prescriptions filled from our in-house [facility], bring a Credit/Debit Card or cash with you. A co-pay may be required.  The pharmacy closes at 5:30 pm.    Wear loose fitting clothes allowing for bandages.    Please leave money and valuables home.      You may bring your cell phone.    Call the doctor if fever or illness should occur before your surgery.    Call [phone] to contact us here if needed.                            CLOTHES ON DAY OF SURGERY    SHOULDER surgery:  you must have a very oversized shirt.  Very, Very large.  You will probably have a large sling on with your arm strapped to your chest.  You will not be able to put the arm of the operated shoulder into a sleeve.  You can put the arm of the un-operated shoulder into the sleeve, but the shirt will need to be draped over the operated shoulder.       ARM or HAND surgery:  make sure that your sleeves are large and loose enough to pass over large dressings or cast.      BREAST or UNDERARM surgery:  wear a loose, button down shirt so that you can dress without raising your arms over your head.    ABDOMINAL surgery:  wear loose, comfortable clothing.  Nothing tight around the abdomen.  NO JEANS    PENIS or SCROTAL surgery:  loose comfortable clothing.  Large sweat pants, pajama pants or a robe.  ABSOLUTELY NO JEANS      LEG or FOOT surgery:  wear large loose pants that are able to pass over any large dressings or casts.  You could also wear loose shorts or a skirt.

## 2023-07-21 ENCOUNTER — HOSPITAL ENCOUNTER (OUTPATIENT)
Facility: HOSPITAL | Age: 63
Discharge: HOME | End: 2023-07-21
Attending: SURGERY | Admitting: SURGERY
Payer: MEDICAID

## 2023-07-21 ENCOUNTER — ANESTHESIA (OUTPATIENT)
Dept: SURGERY | Facility: HOSPITAL | Age: 63
End: 2023-07-21
Payer: MEDICAID

## 2023-07-21 VITALS
OXYGEN SATURATION: 97 % | SYSTOLIC BLOOD PRESSURE: 178 MMHG | BODY MASS INDEX: 35.77 KG/M2 | RESPIRATION RATE: 18 BRPM | DIASTOLIC BLOOD PRESSURE: 81 MMHG | TEMPERATURE: 98 F | WEIGHT: 228.38 LBS | HEART RATE: 58 BPM

## 2023-07-21 DIAGNOSIS — M31.6 GIANT CELL ARTERITIS: ICD-10-CM

## 2023-07-21 DIAGNOSIS — I77.6 ARTERITIS: ICD-10-CM

## 2023-07-21 LAB — POCT GLUCOSE: 108 MG/DL (ref 70–110)

## 2023-07-21 PROCEDURE — 88313 SPECIAL STAINS GROUP 2: CPT | Performed by: PATHOLOGY

## 2023-07-21 PROCEDURE — D9220A PRA ANESTHESIA: ICD-10-PCS | Mod: ANES,,, | Performed by: ANESTHESIOLOGY

## 2023-07-21 PROCEDURE — 88313 PR  SPECIAL STAINS,GROUP II: ICD-10-PCS | Mod: 26,,, | Performed by: PATHOLOGY

## 2023-07-21 PROCEDURE — 63600175 PHARM REV CODE 636 W HCPCS: Performed by: ANESTHESIOLOGY

## 2023-07-21 PROCEDURE — D9220A PRA ANESTHESIA: Mod: CRNA,,, | Performed by: STUDENT IN AN ORGANIZED HEALTH CARE EDUCATION/TRAINING PROGRAM

## 2023-07-21 PROCEDURE — D9220A PRA ANESTHESIA: Mod: ANES,,, | Performed by: ANESTHESIOLOGY

## 2023-07-21 PROCEDURE — 36000707: Performed by: SURGERY

## 2023-07-21 PROCEDURE — 25000003 PHARM REV CODE 250: Performed by: SURGERY

## 2023-07-21 PROCEDURE — 37609 PR TEMPORAL ARTERY LIGATN OR BX: ICD-10-PCS | Mod: RT,,, | Performed by: SURGERY

## 2023-07-21 PROCEDURE — 63600175 PHARM REV CODE 636 W HCPCS: Performed by: STUDENT IN AN ORGANIZED HEALTH CARE EDUCATION/TRAINING PROGRAM

## 2023-07-21 PROCEDURE — 71000015 HC POSTOP RECOV 1ST HR: Performed by: SURGERY

## 2023-07-21 PROCEDURE — 37000008 HC ANESTHESIA 1ST 15 MINUTES: Performed by: SURGERY

## 2023-07-21 PROCEDURE — 63600175 PHARM REV CODE 636 W HCPCS: Performed by: SURGERY

## 2023-07-21 PROCEDURE — 88305 TISSUE EXAM BY PATHOLOGIST: CPT | Mod: 26,,, | Performed by: PATHOLOGY

## 2023-07-21 PROCEDURE — 37000009 HC ANESTHESIA EA ADD 15 MINS: Performed by: SURGERY

## 2023-07-21 PROCEDURE — D9220A PRA ANESTHESIA: ICD-10-PCS | Mod: CRNA,,, | Performed by: STUDENT IN AN ORGANIZED HEALTH CARE EDUCATION/TRAINING PROGRAM

## 2023-07-21 PROCEDURE — 25000003 PHARM REV CODE 250: Performed by: ANESTHESIOLOGY

## 2023-07-21 PROCEDURE — 88313 SPECIAL STAINS GROUP 2: CPT | Mod: 26,,, | Performed by: PATHOLOGY

## 2023-07-21 PROCEDURE — 36000706: Performed by: SURGERY

## 2023-07-21 PROCEDURE — 71000016 HC POSTOP RECOV ADDL HR: Performed by: SURGERY

## 2023-07-21 PROCEDURE — 88305 TISSUE EXAM BY PATHOLOGIST: CPT | Performed by: PATHOLOGY

## 2023-07-21 PROCEDURE — 88305 TISSUE EXAM BY PATHOLOGIST: ICD-10-PCS | Mod: 26,,, | Performed by: PATHOLOGY

## 2023-07-21 PROCEDURE — 37609 LIGATION/BX TEMPORAL ARTERY: CPT | Mod: RT,,, | Performed by: SURGERY

## 2023-07-21 RX ORDER — ACETAMINOPHEN 500 MG
1000 TABLET ORAL
Status: COMPLETED | OUTPATIENT
Start: 2023-07-21 | End: 2023-07-21

## 2023-07-21 RX ORDER — PROPOFOL 10 MG/ML
VIAL (ML) INTRAVENOUS
Status: DISCONTINUED | OUTPATIENT
Start: 2023-07-21 | End: 2023-07-21

## 2023-07-21 RX ORDER — FENTANYL CITRATE 50 UG/ML
INJECTION, SOLUTION INTRAMUSCULAR; INTRAVENOUS
Status: DISCONTINUED | OUTPATIENT
Start: 2023-07-21 | End: 2023-07-21

## 2023-07-21 RX ORDER — SODIUM CHLORIDE 9 MG/ML
INJECTION, SOLUTION INTRAVENOUS CONTINUOUS
Status: DISCONTINUED | OUTPATIENT
Start: 2023-07-21 | End: 2023-07-21 | Stop reason: HOSPADM

## 2023-07-21 RX ORDER — SODIUM CHLORIDE 0.9 % (FLUSH) 0.9 %
10 SYRINGE (ML) INJECTION
Status: DISCONTINUED | OUTPATIENT
Start: 2023-07-21 | End: 2023-07-21 | Stop reason: HOSPADM

## 2023-07-21 RX ORDER — ETOMIDATE 2 MG/ML
INJECTION INTRAVENOUS
Status: DISCONTINUED | OUTPATIENT
Start: 2023-07-21 | End: 2023-07-21

## 2023-07-21 RX ORDER — HYDROMORPHONE HYDROCHLORIDE 2 MG/ML
0.2 INJECTION, SOLUTION INTRAMUSCULAR; INTRAVENOUS; SUBCUTANEOUS EVERY 5 MIN PRN
Status: DISCONTINUED | OUTPATIENT
Start: 2023-07-21 | End: 2023-07-21 | Stop reason: HOSPADM

## 2023-07-21 RX ORDER — CEFAZOLIN SODIUM 2 G/50ML
2 SOLUTION INTRAVENOUS
Status: COMPLETED | OUTPATIENT
Start: 2023-07-21 | End: 2023-07-21

## 2023-07-21 RX ORDER — LIDOCAINE HYDROCHLORIDE 10 MG/ML
1 INJECTION, SOLUTION EPIDURAL; INFILTRATION; INTRACAUDAL; PERINEURAL ONCE
Status: DISCONTINUED | OUTPATIENT
Start: 2023-07-21 | End: 2023-07-21 | Stop reason: SDUPTHER

## 2023-07-21 RX ORDER — SODIUM CHLORIDE, SODIUM LACTATE, POTASSIUM CHLORIDE, CALCIUM CHLORIDE 600; 310; 30; 20 MG/100ML; MG/100ML; MG/100ML; MG/100ML
INJECTION, SOLUTION INTRAVENOUS CONTINUOUS
Status: DISCONTINUED | OUTPATIENT
Start: 2023-07-21 | End: 2023-07-21 | Stop reason: HOSPADM

## 2023-07-21 RX ORDER — MIDAZOLAM HYDROCHLORIDE 1 MG/ML
INJECTION INTRAMUSCULAR; INTRAVENOUS
Status: DISCONTINUED | OUTPATIENT
Start: 2023-07-21 | End: 2023-07-21

## 2023-07-21 RX ORDER — ONDANSETRON 2 MG/ML
4 INJECTION INTRAMUSCULAR; INTRAVENOUS DAILY PRN
Status: DISCONTINUED | OUTPATIENT
Start: 2023-07-21 | End: 2023-07-21 | Stop reason: HOSPADM

## 2023-07-21 RX ORDER — LIDOCAINE HYDROCHLORIDE 10 MG/ML
INJECTION INFILTRATION; PERINEURAL
Status: DISCONTINUED | OUTPATIENT
Start: 2023-07-21 | End: 2023-07-21 | Stop reason: HOSPADM

## 2023-07-21 RX ORDER — PROPOFOL 10 MG/ML
VIAL (ML) INTRAVENOUS CONTINUOUS PRN
Status: DISCONTINUED | OUTPATIENT
Start: 2023-07-21 | End: 2023-07-21

## 2023-07-21 RX ORDER — LIDOCAINE HYDROCHLORIDE 10 MG/ML
1 INJECTION, SOLUTION EPIDURAL; INFILTRATION; INTRACAUDAL; PERINEURAL ONCE
Status: DISCONTINUED | OUTPATIENT
Start: 2023-07-21 | End: 2023-07-21 | Stop reason: HOSPADM

## 2023-07-21 RX ORDER — BUPIVACAINE HYDROCHLORIDE 2.5 MG/ML
INJECTION, SOLUTION INFILTRATION; PERINEURAL
Status: DISCONTINUED | OUTPATIENT
Start: 2023-07-21 | End: 2023-07-21 | Stop reason: HOSPADM

## 2023-07-21 RX ADMIN — PROPOFOL 10 MG: 10 INJECTION, EMULSION INTRAVENOUS at 10:07

## 2023-07-21 RX ADMIN — FENTANYL CITRATE 50 MCG: 50 INJECTION, SOLUTION INTRAMUSCULAR; INTRAVENOUS at 09:07

## 2023-07-21 RX ADMIN — PROPOFOL 20 MG: 10 INJECTION, EMULSION INTRAVENOUS at 10:07

## 2023-07-21 RX ADMIN — PROPOFOL 25 MCG/KG/MIN: 10 INJECTION, EMULSION INTRAVENOUS at 10:07

## 2023-07-21 RX ADMIN — SODIUM CHLORIDE, POTASSIUM CHLORIDE, SODIUM LACTATE AND CALCIUM CHLORIDE: 600; 310; 30; 20 INJECTION, SOLUTION INTRAVENOUS at 06:07

## 2023-07-21 RX ADMIN — CEFAZOLIN SODIUM 2 G: 2 SOLUTION INTRAVENOUS at 10:07

## 2023-07-21 RX ADMIN — MIDAZOLAM HYDROCHLORIDE 2 MG: 1 INJECTION INTRAMUSCULAR; INTRAVENOUS at 09:07

## 2023-07-21 RX ADMIN — SODIUM CHLORIDE, POTASSIUM CHLORIDE, SODIUM LACTATE AND CALCIUM CHLORIDE: 600; 310; 30; 20 INJECTION, SOLUTION INTRAVENOUS at 09:07

## 2023-07-21 RX ADMIN — ACETAMINOPHEN 1000 MG: 500 TABLET ORAL at 06:07

## 2023-07-21 NOTE — DISCHARGE INSTRUCTIONS
[facility]  Office # [phone]    Discharge Instructions for Same Day Surgery     Call the office for and appointment if one has not already been made.     Diet: Drink plenty of fluids the first 48 hours and you may resume your   usual diet.     Activity: No heavy lifting (over 10 pounds), pushing or pulling until your   post op visit. Your doctor's office may have told you to limit your lifting to less weight, or even no weight.  Be sure to follow those instructions.    Note: You may ride in a car and you may drive when comfortable.     Do not drive, drink alcohol, or sign legal documents for 24 hours, or if taking narcotic pain medication.    You have steri strips ( appears to be strips of white tape) on   your incision, leave them on. In 5-7 days they will begin to fall off.    Medical: Call the doctor for any of the following problems: fever above 101,   severe pain, bleeding, or abdominal distention (swelling).   If constipated you may take any stool softener you choose.     Occasionally small areas of skin numbness or an unpleasant skin sensation can result. Also, you may find that your incision is swollen and tender for a few days.  Some redness around sutures and staples is a normal reaction, but if the discomfort persists or worsens, call you doctor.      Fall Prevention  Millions of people fall every year and injure themselves. You may have had anesthesia or sedation which may increase your risk of falling. You may have health issues that put you at an increased risk of falling.     Here are ways to reduce your risk of falling.    Make your home safe by keeping walkways clear of objects you may trip over.  Use non-slip pads under rugs. Do not use area rugs or small throw rugs.  Use non-slip mats in bathtubs and showers.  Install handrails and lights on staircases.  Do not walk in poorly lit areas.  Do not stand on chairs or wobbly ladders.  Use caution when reaching overhead or  looking upward. This position can cause a loss of balance.  Be sure your shoes fit properly, have non-slip bottoms and are in good condition.   Wear shoes both inside and out. Avoid going barefoot or wearing slippers.  Be cautious when going up and down stairs, curbs, and when walking on uneven sidewalks.  If your balance is poor, consider using a cane or walker.  If your fall was related to alcohol use, stop or limit alcohol intake.   If your fall was related to use of sleeping medicines, talk to your doctor about this. You may need to reduce your dosage at bedtime if you awaken during the night to go to the bathroom.    To reduce the need for nighttime bathroom trips:  Avoid drinking fluids for several hours before going to bed  Empty your bladder before going to bed  Men can keep a urinal at the bedside  Stay as active as you can. Balance, flexibility, strength, and endurance all come from exercise. They all play a role in preventing falls. Ask your healthcare provider which types of activity are right for you.  Get your vision checked on a regular basis.  If you have pets, know where they are before you stand up or walk so you don't trip over them.  Use night lights.

## 2023-07-21 NOTE — ANESTHESIA POSTPROCEDURE EVALUATION
Anesthesia Post Evaluation    Patient: Trevor Rivera    Procedure(s) Performed: Procedure(s) (LRB):  BIOPSY, ARTERY, TEMPORAL (Right)    Final Anesthesia Type: MAC      Patient location during evaluation: [facility]  Patient participation: Yes- Able to Participate  Level of consciousness: awake and alert  Post-procedure vital signs: reviewed and stable  Pain management: adequate  Airway patency: patent    PONV status at discharge: No PONV  Anesthetic complications: no      Cardiovascular status: hemodynamically stable and blood pressure returned to baseline  Respiratory status: room air, unassisted and spontaneous ventilation  Hydration status: euvolemic  Follow-up not needed.          Vitals Value Taken Time   /81 07/21/23 1052   Temp 36.6 °C (97.9 °F) 07/21/23 1052   Pulse 58 07/21/23 1052   Resp 20 07/21/23 1118   SpO2 97 % 07/21/23 1052         No case tracking events are documented in the log.      Pain/Sj Score: Pain Rating Prior to Med Admin: 0 (7/21/2023  6:58 AM)

## 2023-07-21 NOTE — TRANSFER OF CARE
Anesthesia Transfer of Care Note    Patient: Trevor Rivera    Procedure(s) Performed: Procedure(s) (LRB):  BIOPSY, ARTERY, TEMPORAL (Right)    Patient location: [facility]    Anesthesia Type: MAC    Transport from OR: Transported from OR on room air with adequate spontaneous ventilation    Post pain: adequate analgesia    Post assessment: no apparent anesthetic complications and tolerated procedure well    Post vital signs: stable    Level of consciousness: awake, alert and oriented    Nausea/Vomiting: no nausea/vomiting    Complications: none    Transfer of care protocol was followed      Last vitals:   Visit Vitals  BP (!) 178/81 (BP Location: Right arm, Patient Position: Lying)   Pulse (!) 58   Temp 36.6 °C (97.9 °F) (Oral)   Resp 18   Wt 103.6 kg (228 lb 6 oz)   SpO2 97%   BMI 35.77 kg/m²

## 2023-07-21 NOTE — ANESTHESIA PREPROCEDURE EVALUATION
[facility]  Anesthesia Pre-Operative Evaluation         Patient Name: Trevor Rivera  YOB: 1960  MRN: [id number]    SUBJECTIVE:     Pre-operative evaluation for Procedure(s) (LRB):  BIOPSY, ARTERY, TEMPORAL (Right)     07/21/2023    Trevor Rivera is a 63 y.o. male w/ a significant PMHx of HTN, tobacco abuse, h/o DVT/PE 5/2023 who presents due to R sided vision changes.    Patient now presents for the above procedure(s).    TTE: 5/13/22   The left ventricle is normal in size with concentric hypertrophy and normal systolic function.   The estimated ejection fraction is 60%.   Grade II left ventricular diastolic dysfunction.   Normal right ventricular size with normal right ventricular systolic function.   Mild left atrial enlargement.   Normal central venous pressure (3 mmHg).   The estimated PA systolic pressure is 23 mmHg.   TDS      LDA: None documented.    Prev airway: None documented.    Drips:    sodium chloride 0.9%      lactated ringers 10 mL/hr at 07/21/23 0635       Patient Active Problem List   Diagnosis    History of pulmonary embolism    Class 1 obesity in adult    Essential hypertension    History of deep vein thrombosis    Nicotine dependence, cigarettes, uncomplicated    Atherosclerosis of aorta       Review of patient's allergies indicates:  No Known Allergies    Current Inpatient Medications:   acetaminophen  1,000 mg Oral Once Pre-Op    LIDOcaine (PF) 10 mg/ml (1%)  1 mL Intradermal Once       No current facility-administered medications on file prior to encounter.     Current Outpatient Medications on File Prior to Encounter   Medication Sig Dispense Refill    gabapentin (NEURONTIN) 100 MG capsule Take 100 mg by mouth 2 (two) times daily.      lisinopril (PRINIVIL,ZESTRIL) 20 MG tablet Take 20 mg by mouth 2 (two) times daily.      meloxicam (MOBIC) 7.5 MG tablet Take 7.5 mg by mouth 2 (two) times daily.      predniSONE (DELTASONE)  20 MG tablet Take 40 mg by mouth 2 (two) times daily.         Past Surgical History:   Procedure Laterality Date    BACK SURGERY      FOOT SURGERY Left     SPINAL FUSION         OBJECTIVE:     Vital Signs Range (Last 24H):  Temp:  [36.4 °C (97.6 °F)]   Pulse:  [73]   Resp:  [18]   BP: (167)/(82)   SpO2:  [98 %]       Significant Labs:  Lab Results   Component Value Date    WBC 8.91 07/18/2023    HGB 14.7 07/18/2023    HCT 43.3 07/18/2023     07/18/2023    CHOL 128 05/14/2022    TRIG 72 05/14/2022    HDL 53 05/14/2022    ALT 26 07/18/2023    AST 12 07/18/2023     07/18/2023    K 4.5 07/18/2023     07/18/2023    CREATININE 0.9 07/18/2023    BUN 21 07/18/2023    CO2 27 07/18/2023    TSH 2.707 05/13/2022    PSA 0.80 05/13/2022    INR 1.0 05/13/2022       Diagnostic Studies: No relevant studies.    EKG:   Results for orders placed or performed during the hospital encounter of 07/18/23   EKG 12-lead    Collection Time: 07/18/23  2:52 PM    Narrative    Test Reason : Z01.818,    Vent. Rate : 068 BPM     Atrial Rate : 068 BPM     P-R Int : 138 ms          QRS Dur : 088 ms      QT Int : 384 ms       P-R-T Axes : 042 040 019 degrees     QTc Int : 408 ms    Normal sinus rhythm  Possible Left atrial enlargement  Borderline Abnormal ECG  When compared with ECG of 08-AUG-2022 08:30,  No significant change was found  Confirmed by Matthieu Mcnair MD ([id number]) on 7/19/2023 8:04:43 PM    Referred By:             Confirmed By:Matthieu Mcnair MD       ASSESSMENT/PLAN:                                                                                                                07/21/2023  Trevor Rivera is a 63 y.o., male.      Pre-op Assessment    I have reviewed the Patient Summary Reports.     I have reviewed the Nursing Notes. I have reviewed the NPO Status.   I have reviewed the Medications.     Review of Systems  Anesthesia Hx:  No problems with previous Anesthesia  History of prior surgery of interest  to airway management or planning: Denies Family Hx of Anesthesia complications.   Denies Personal Hx of Anesthesia complications.   Social:  No Alcohol Use    Hematology/Oncology:     Oncology Normal   Oncology Comments: H/o BL PE + DVT 5/2023 discharged on Eliquis    Cardiovascular:   Hypertension  Denies Angina. ECG has been reviewed.    Pulmonary:   Denies Shortness of breath.  Denies Recent URI. Sleep Apnea    Renal/:  Renal/ Normal     Hepatic/GI:   Denies GERD. Denies Liver Disease.    Musculoskeletal:  Musculoskeletal Normal    Neurological:   Denies CVA. Denies Seizures.    Endocrine:  Endocrine Normal Denies Diabetes.  Obesity / BMI > 30  Dermatological:  Skin Normal    Psych:  Psychiatric Normal           Physical Exam  General: Well nourished, Cooperative, Alert and Oriented    Airway:  Mallampati: II   Mouth Opening: Normal  TM Distance: Normal  Tongue: Normal  Neck ROM: Normal ROM    Dental:  Intact        Anesthesia Plan  Type of Anesthesia, risks & benefits discussed:    Anesthesia Type: MAC, Gen Natural Airway  Intra-op Monitoring Plan: Standard ASA Monitors  Post Op Pain Control Plan: multimodal analgesia and IV/PO Opioids PRN  Induction:  IV  Airway Plan: Direct and Video, Post-Induction  Informed Consent: Informed consent signed with the Patient and all parties understand the risks and agree with anesthesia plan.  All questions answered. Patient consented to blood products? No  ASA Score: 2  Day of Surgery Review of History & Physical: H&P Update referred to the surgeon/provider.    Ready For Surgery From Anesthesia Perspective.     .

## 2023-07-21 NOTE — OP NOTE
[facility]  Surgery Department  Operative Note    SUMMARY     Date of Procedure: 7/21/2023     Primary Surgeon: Surgeon(s) and Role:     * Ricco Valles MD - Primary   Assisting Surgeon: None    Pre-Operative Diagnosis: Arteritis [I77.6]    Post-op Diagnosis: Same    Anesthesia: Choice     Procedure: Procedure(s) (LRB):  BIOPSY, ARTERY, TEMPORAL (Right)     Indications for the procedure: Trevor Rivera is a 63 y.o. with Giant Cell Arteritis. We recommended they undergo a right temporal artery biopsy and the patient agreed to proceed. The patient signed informed consent and expressed understanding of the risks, benefits, and alternatives of surgery.     Operative Findings (including complications, if any):   1) Right Temporal Artery biopsy  2) No immediate complications    Procedure in Detail:   After informed consent was obtained the patient taken to the operating room supine position.  The patient's head was turned to the left to allow exposure of his opposite temple. The hair was shaved over the right temple. The path of the superficial artery was identified with ultrasound and marked with a skin marker.  A cotton swab was gently packed  in the patient's ear.  The corresponding temple was then prepped and draped in sterile fashion.  A time-out was performed to confirm appropriate patient identification, procedure, and laterality.     The area previously marked was then anesthetized with 0.25% marcaine and 1% lidocaine injection.  Incision was made using a 15 blade.  This was deepened through the fascia with electrocautery and blunt dissection.  The superficial temporal artery could be identified pulsating just beneath the fascia.  The artery was exposed circumferentially throughout the length of the incision.  Once we had approximately 2.5cm of the artery exposed the artery was divided proximally and distally and a 1 cm segment submitted to pathology. The proximal and distal ends were  ligated with 3-0 Vicryl ties and clamps removed.  The wound was thoroughly irrigated with saline.  There was no ongoing bleeding within the wound.  The incision was closed with two interrupted 3 0 Vicryl sutures in the deep dermis.  The skin was closed with running 4 Vicryl suture in the subcuticular layer.  The incision was dressed with Steri-Strips.    The patient tolerated the procedure well and was transferred to the PACU in stable condition.      Estimated Blood Loss (EBL):  less than 3cc           Implants: * No implants in log *    Specimens: Right Temporal Artery             Condition: Stable    Disposition: PACU - hemodynamically stable.    Attestation: Dr. Valles was present for and either directly performed or assisted with the critical and key portions of the procedure.

## 2023-07-21 NOTE — PLAN OF CARE
Pre-op plan of care reviewed with patient and family. Admit assessment complete. Questions encouraged and answered. Post-op education begun with pt.  Dr. Valles notified pt has been taking Mobic BID since 7/1/2023 with last dose 7/20/2023 at 2000.

## 2023-07-21 NOTE — BRIEF OP NOTE
[facility] [place]  Brief Operative Note    Surgery Date: 7/21/2023     Surgeon(s) and Role:     * Ricco Valles MD - Primary    Assisting Surgeon: Osvaldo Ramírez MD    Pre-op Diagnosis:  Arteritis [I77.6]    Post-op Diagnosis:  Post-Op Diagnosis Codes:     * Arteritis [I77.6]    Procedure(s) (LRB):  BIOPSY, ARTERY, TEMPORAL (Right)    Anesthesia: Choice    Operative Findings: Right Temporal artery biopsy    Estimated Blood Loss: less than 3cc         Specimens:   Right Temporal Artery        Discharge Note    OUTCOME: Patient tolerated treatment/procedure well without complication and is now ready for discharge.    DISPOSITION: Home or Self Care    FINAL DIAGNOSIS:  Giant Cell Arteritis    FOLLOWUP: In clinic    DISCHARGE INSTRUCTIONS:    Discharge Procedure Orders   No dressing needed     Notify your health care provider if you experience any of the following:  temperature >100.4     Notify your health care provider if you experience any of the following:  persistent nausea and vomiting or diarrhea     Notify your health care provider if you experience any of the following:  severe uncontrolled pain     Notify your health care provider if you experience any of the following:  redness, tenderness, or signs of infection (pain, swelling, redness, odor or green/yellow discharge around incision site)     Notify your health care provider if you experience any of the following:  difficulty breathing or increased cough     Notify your health care provider if you experience any of the following:  severe persistent headache     Notify your health care provider if you experience any of the following:  worsening rash     Notify your health care provider if you experience any of the following:  persistent dizziness, light-headedness, or visual disturbances     Notify your health care provider if you experience any of the following:  increased confusion or weakness     Activity as tolerated

## 2023-07-23 ENCOUNTER — NURSE TRIAGE (OUTPATIENT)
Dept: ADMINISTRATIVE | Facility: CLINIC | Age: 63
End: 2023-07-23
Payer: MEDICAID

## 2023-07-23 NOTE — TELEPHONE ENCOUNTER
Pt asking if ok to get steri strips wet. Per chart, pt post op day 2. Pt denies redness, drainage, fever or pain to incision site. Per protocol, home care advised. Discussed following surgeon instructions, keep steri strips in place for 5-7 days and will usually  begin to curl and peel off, advised ok to get wet, do not soak in water, pat dry. Advised pt to monitor for signs of infection or any changes. Pt verbalizes understanding.       Reason for Disposition   Skin tape (e.g., Steri-strips) removal, questions about    Additional Information   Negative: [1] Major abdominal surgical incision AND [2] wound gaping open AND [3] visible internal organs   Negative: Sounds like a life-threatening emergency to the triager   Negative: [1] Bleeding from incision AND [2] won't stop after 10 minutes of direct pressure   Negative: [1] Widespread rash AND [2] bright red, sunburn-like   Negative: Severe pain in the incision   Negative: [1] Suture came out early AND [2] wound gaping AND [3] < 48 hours since sutures placed   Negative: [1] Incision gaping open AND [2] length of opening > 2 inches (5 cm)   Negative: Patient sounds very sick or weak to the triager   Negative: Sounds like a serious complication to the triager   Negative: Fever > 100.5 F (38.1 C)   Negative: [1] Incision looks infected (spreading redness, pain) AND [2] fever > 99.5 F (37.5 C)   Negative: [1] Incision looks infected (spreading redness, pain) AND [2] large red area (> 2 in. or 5 cm)   Negative: [1] Incision looks infected (spreading redness, pain) AND [2] face wound   Negative: [1] Red streak runs from the incision AND [2] longer than 1 inch (2.5 cm)   Negative: [1] Pus or bad-smelling fluid draining from incision AND [2] no fever   Negative: [1] Post-op pain AND [2] not controlled with pain medications   Negative: Dressing soaked with blood or body fluid (e.g., drainage)   Negative: [1] Caller has URGENT question AND [2] triager unable to answer  question   Negative: [1] Small red area or streak AND [2] no fever   Negative: [1] Clear or blood-tinged fluid draining from wound AND [2] no fever   Negative: [1] 48 hours since surgery AND [2] wound is becoming more tender   Negative: [1] Incision gaping open AND [2] on the face AND [3] > 48 hours since sutures placed   Negative: [1] Incision gaping open AND [3] length of opening > 1/2 inch (1 cm) AND [3] > 48 hours since sutures placed   Negative: Suture or staple removal is overdue   Negative: [1] Suture or staple came out early AND [2] caller wants wound checked   Negative: [1] Caller has NON-URGENT question AND [2] triager unable to answer question   Negative: Pimple where a stitch comes through the skin   Negative: Suture or staple came out early   Negative: Suture removal date, questions about    Protocols used: Post-Op Incision Symptoms-A-AH

## 2023-07-26 ENCOUNTER — TELEPHONE (OUTPATIENT)
Dept: SURGERY | Facility: CLINIC | Age: 63
End: 2023-07-26
Payer: MEDICAID

## 2023-07-26 NOTE — TELEPHONE ENCOUNTER
----- Message from Reyna Vazquez sent at 7/26/2023 11:29 AM CDT -----  .Type: Patient Call Back    Who called: Jane styles/ Dr. Edi Seo     What is the request in detail: Requesting Biopsy results     Can the clinic reply by [facility]? No     Would the patient rather a call back or a response via [facility]? Call Back     Best call back number: [phone] (office) [phone] (fax)     Additional Information:

## 2023-07-26 NOTE — TELEPHONE ENCOUNTER
Spoke with Emely styles/ Dr. Edi Seo office. I informed her that the patient Biopsy result is in processing and is not ready.

## 2023-07-27 ENCOUNTER — TELEPHONE (OUTPATIENT)
Dept: SURGERY | Facility: CLINIC | Age: 63
End: 2023-07-27
Payer: MEDICAID

## 2023-07-27 LAB
COMMENT: NORMAL
FINAL PATHOLOGIC DIAGNOSIS: NORMAL
GROSS: NORMAL
Lab: NORMAL

## 2023-07-27 NOTE — TELEPHONE ENCOUNTER
----- Message from Ning Ware sent at 7/27/2023  1:57 PM CDT -----  Regarding: [facility] : [phone]  Type: Patient Call Back     What is the request in detail: Pt had a biopsy done on 7/21 and [facility] on behalf Edi Soares office and would like to know if the results are in, if so please call back at the number provided below     Can the clinic reply by [facility]? No     Would the patient rather a call back or a response via [facility]? Call back    Best call back number: [phone]    Additional Information:    Thank you.

## 2023-07-27 NOTE — TELEPHONE ENCOUNTER
I spoke with Jane, I informed her that patient results are not ready and are still processing. I informed Jane that the provider is out of the clinic.     Jane staes that the patient was taking a high dosage of medication and that is why she is trying to get the results and to also start treatment.    Jane states that the patient was under the impression that his results would be back in 2-3 days. I informed her that may not always be the case so the patient should not have been told that would be the wait time for his results.    Jane requested the phone hanyber to the lab.     Phone number provided to the the [facility] to ask for lab at [phone]

## 2023-08-08 ENCOUNTER — OFFICE VISIT (OUTPATIENT)
Dept: SURGERY | Facility: CLINIC | Age: 63
End: 2023-08-08
Payer: MEDICAID

## 2023-08-08 VITALS
HEIGHT: 67 IN | BODY MASS INDEX: 34.52 KG/M2 | WEIGHT: 219.94 LBS | SYSTOLIC BLOOD PRESSURE: 140 MMHG | DIASTOLIC BLOOD PRESSURE: 77 MMHG | OXYGEN SATURATION: 100 % | HEART RATE: 71 BPM

## 2023-08-08 DIAGNOSIS — I77.6 ARTERITIS: Primary | ICD-10-CM

## 2023-08-08 PROCEDURE — 3077F PR MOST RECENT SYSTOLIC BLOOD PRESSURE >= 140 MM HG: ICD-10-PCS | Mod: CPTII,,, | Performed by: SURGERY

## 2023-08-08 PROCEDURE — 99999 PR PBB SHADOW E&M-EST. PATIENT-LVL III: CPT | Mod: PBBFAC,,, | Performed by: SURGERY

## 2023-08-08 PROCEDURE — 3077F SYST BP >= 140 MM HG: CPT | Mod: CPTII,,, | Performed by: SURGERY

## 2023-08-08 PROCEDURE — 99024 PR POST-OP FOLLOW-UP VISIT: ICD-10-PCS | Mod: ,,, | Performed by: SURGERY

## 2023-08-08 PROCEDURE — 4010F PR ACE/ARB THEARPY RXD/TAKEN: ICD-10-PCS | Mod: CPTII,,, | Performed by: SURGERY

## 2023-08-08 PROCEDURE — 3078F PR MOST RECENT DIASTOLIC BLOOD PRESSURE < 80 MM HG: ICD-10-PCS | Mod: CPTII,,, | Performed by: SURGERY

## 2023-08-08 PROCEDURE — 1159F MED LIST DOCD IN RCRD: CPT | Mod: CPTII,,, | Performed by: SURGERY

## 2023-08-08 PROCEDURE — 4010F ACE/ARB THERAPY RXD/TAKEN: CPT | Mod: CPTII,,, | Performed by: SURGERY

## 2023-08-08 PROCEDURE — 1159F PR MEDICATION LIST DOCUMENTED IN MEDICAL RECORD: ICD-10-PCS | Mod: CPTII,,, | Performed by: SURGERY

## 2023-08-08 PROCEDURE — 99024 POSTOP FOLLOW-UP VISIT: CPT | Mod: ,,, | Performed by: SURGERY

## 2023-08-08 PROCEDURE — 99999 PR PBB SHADOW E&M-EST. PATIENT-LVL III: ICD-10-PCS | Mod: PBBFAC,,, | Performed by: SURGERY

## 2023-08-08 PROCEDURE — 3008F PR BODY MASS INDEX (BMI) DOCUMENTED: ICD-10-PCS | Mod: CPTII,,, | Performed by: SURGERY

## 2023-08-08 PROCEDURE — 3008F BODY MASS INDEX DOCD: CPT | Mod: CPTII,,, | Performed by: SURGERY

## 2023-08-08 PROCEDURE — 99213 OFFICE O/P EST LOW 20 MIN: CPT | Mod: PBBFAC | Performed by: SURGERY

## 2023-08-08 PROCEDURE — 3078F DIAST BP <80 MM HG: CPT | Mod: CPTII,,, | Performed by: SURGERY

## 2023-08-08 NOTE — PROGRESS NOTES
[age] y/o man with history of temporal artery biopsy, now about 2 weeks out.  No complaints this am, reports feeling well.    PE: incision c/d/i no evidence of infection or hernia    Impression: post op, doing well    Plan: gradually resume normal activity, normal diet, and follow up as needed.

## 2025-01-03 ENCOUNTER — LAB VISIT (OUTPATIENT)
Dept: LAB | Facility: HOSPITAL | Age: 65
End: 2025-01-03
Attending: INTERNAL MEDICINE
Payer: MEDICAID

## 2025-01-03 DIAGNOSIS — Z95.2 HEART VALVE REPLACED BY TRANSPLANT: Primary | ICD-10-CM

## 2025-01-03 LAB
INR PPP: 1.7 (ref 0.8–1.2)
PROTHROMBIN TIME: 17.5 SEC (ref 9–12.5)

## 2025-01-03 PROCEDURE — 36415 COLL VENOUS BLD VENIPUNCTURE: CPT | Mod: PN | Performed by: INTERNAL MEDICINE

## 2025-01-03 PROCEDURE — 85610 PROTHROMBIN TIME: CPT | Mod: PN | Performed by: INTERNAL MEDICINE

## 2025-01-31 ENCOUNTER — LAB VISIT (OUTPATIENT)
Dept: LAB | Facility: HOSPITAL | Age: 65
End: 2025-01-31
Attending: INTERNAL MEDICINE
Payer: MEDICAID

## 2025-01-31 DIAGNOSIS — Z95.2 HEART VALVE REPLACED BY TRANSPLANT: Primary | ICD-10-CM

## 2025-01-31 LAB
INR PPP: 1.6 (ref 0.8–1.2)
PROTHROMBIN TIME: 16.9 SEC (ref 9–12.5)

## 2025-01-31 PROCEDURE — 85610 PROTHROMBIN TIME: CPT | Mod: PN | Performed by: INTERNAL MEDICINE

## 2025-01-31 PROCEDURE — 36415 COLL VENOUS BLD VENIPUNCTURE: CPT | Mod: PN | Performed by: INTERNAL MEDICINE

## 2025-02-14 ENCOUNTER — LAB VISIT (OUTPATIENT)
Dept: LAB | Facility: HOSPITAL | Age: 65
End: 2025-02-14
Attending: INTERNAL MEDICINE
Payer: MEDICAID

## 2025-02-14 DIAGNOSIS — Z95.2 HEART VALVE REPLACED BY TRANSPLANT: Primary | ICD-10-CM

## 2025-02-14 LAB
INR PPP: 1.5 (ref 0.8–1.2)
PROTHROMBIN TIME: 15.6 SEC (ref 9–12.5)

## 2025-02-14 PROCEDURE — 36415 COLL VENOUS BLD VENIPUNCTURE: CPT | Mod: PN | Performed by: INTERNAL MEDICINE

## 2025-02-14 PROCEDURE — 85610 PROTHROMBIN TIME: CPT | Mod: PN | Performed by: INTERNAL MEDICINE

## 2025-02-27 ENCOUNTER — LAB VISIT (OUTPATIENT)
Dept: LAB | Facility: HOSPITAL | Age: 65
End: 2025-02-27
Attending: INTERNAL MEDICINE
Payer: MEDICAID

## 2025-02-27 DIAGNOSIS — Z95.2 HEART VALVE REPLACED BY TRANSPLANT: Primary | ICD-10-CM

## 2025-02-27 LAB
INR PPP: 1.4 (ref 0.8–1.2)
PROTHROMBIN TIME: 14.7 SEC (ref 9–12.5)

## 2025-02-27 PROCEDURE — 36415 COLL VENOUS BLD VENIPUNCTURE: CPT | Mod: PN | Performed by: INTERNAL MEDICINE

## 2025-02-27 PROCEDURE — 85610 PROTHROMBIN TIME: CPT | Mod: PN | Performed by: INTERNAL MEDICINE

## (undated) DEVICE — POSITIONER HEAD DONUT 9IN FOAM

## (undated) DEVICE — SUT 3-0 VICRYL / RB-1

## (undated) DEVICE — SEE MEDLINE ITEM 157110

## (undated) DEVICE — SUT VICRYL 4-0 27 RB-1

## (undated) DEVICE — KIT PROBE COVER WITH GEL

## (undated) DEVICE — ADHESIVE MASTISOL VIAL 48/BX

## (undated) DEVICE — ELECTRODE REM PLYHSV RETURN 9

## (undated) DEVICE — TOWEL OR DISP STRL BLUE 4/PK

## (undated) DEVICE — SUT VICRYL 3-0 27 SH

## (undated) DEVICE — GLOVE SURGICAL LATEX SZ 7

## (undated) DEVICE — CLIPPER BLADE MOD 4406 (CAREF)

## (undated) DEVICE — SEE MEDLINE ITEM 146292

## (undated) DEVICE — NDL 25G X 5/8 REG BEVEL

## (undated) DEVICE — SOL IRR SOD CHL .9% POUR

## (undated) DEVICE — SYR 10CC LUER LOCK

## (undated) DEVICE — SHEET THYROID W/ISO-BAC

## (undated) DEVICE — COVER OVERHEAD SURG LT BLUE

## (undated) DEVICE — STRIP MEDI WND CLSR 1/2X4IN

## (undated) DEVICE — CANISTER SUCTION 2 LTR

## (undated) DEVICE — SEE MEDLINE ITEM 107746

## (undated) DEVICE — APPLICATOR CHLORAPREP ORN 26ML

## (undated) DEVICE — COTTONBALL LG ST

## (undated) DEVICE — SUPPORT ULNA NERVE PROTECTOR